# Patient Record
Sex: FEMALE | Race: WHITE | Employment: PART TIME | URBAN - METROPOLITAN AREA
[De-identification: names, ages, dates, MRNs, and addresses within clinical notes are randomized per-mention and may not be internally consistent; named-entity substitution may affect disease eponyms.]

---

## 2017-03-23 ENCOUNTER — TRANSFERRED RECORDS (OUTPATIENT)
Dept: HEALTH INFORMATION MANAGEMENT | Facility: CLINIC | Age: 77
End: 2017-03-23

## 2017-04-25 ENCOUNTER — RADIANT APPOINTMENT (OUTPATIENT)
Dept: MAMMOGRAPHY | Facility: CLINIC | Age: 77
End: 2017-04-25
Attending: INTERNAL MEDICINE
Payer: COMMERCIAL

## 2017-04-25 DIAGNOSIS — Z12.31 VISIT FOR SCREENING MAMMOGRAM: ICD-10-CM

## 2017-04-25 PROCEDURE — G0202 SCR MAMMO BI INCL CAD: HCPCS | Mod: TC

## 2017-07-10 DIAGNOSIS — E78.5 HYPERLIPIDEMIA LDL GOAL <130: Chronic | ICD-10-CM

## 2017-07-10 NOTE — TELEPHONE ENCOUNTER
pravastatin (PRAVACHOL) 20 MG      Last Written Prescription Date: 7/13/16  Last Fill Quantity: 90, # refills: 3  Last Office Visit with G, P or [facility] prescribing provider: 7/13/16       Lab Results   Component Value Date    CHOL 215 07/13/2016     Lab Results   Component Value Date    HDL 75 07/13/2016     Lab Results   Component Value Date     07/13/2016     Lab Results   Component Value Date    TRIG 70 07/13/2016     Lab Results   Component Value Date    CHOLHDLRATIO 2.8 06/17/2015

## 2017-07-11 RX ORDER — PRAVASTATIN SODIUM 20 MG
TABLET ORAL
Qty: 90 TABLET | Refills: 0 | Status: SHIPPED | OUTPATIENT
Start: 2017-07-11 | End: 2017-10-06

## 2017-07-11 NOTE — TELEPHONE ENCOUNTER
Medication is being filled for 1 time refill only due to:  Patient needs to be seen because it has been more than one year since last visit.  Rebekah Francisco RN- Triage [employer]

## 2017-07-21 DIAGNOSIS — E03.9 HYPOTHYROIDISM, UNSPECIFIED TYPE: ICD-10-CM

## 2017-07-21 RX ORDER — LEVOTHYROXINE SODIUM 75 UG/1
TABLET ORAL
Qty: 30 TABLET | Refills: 0 | Status: SHIPPED | OUTPATIENT
Start: 2017-07-21 | End: 2017-08-23

## 2017-07-21 NOTE — TELEPHONE ENCOUNTER
levothyroxine (SYNTHROID, LEVOTHROID) 75 MCG tablet  Last Written Prescription Date: 07/28/2016  Last Quantity: 90, # refills: 3  Last Office Visit with G, P or [facility] prescribing provider: 07/13/2016        TSH   Date Value Ref Range Status   07/13/2016 0.56 0.40 - 5.00 mU/L Final     Medication is being filled for 1 time refill only due to:  Patient needs to be seen because it has been more than one year since last visit.

## 2017-08-23 ENCOUNTER — OFFICE VISIT (OUTPATIENT)
Dept: FAMILY MEDICINE | Facility: CLINIC | Age: 77
End: 2017-08-23
Payer: COMMERCIAL

## 2017-08-23 VITALS
WEIGHT: 151 LBS | BODY MASS INDEX: 31.7 KG/M2 | HEIGHT: 58 IN | SYSTOLIC BLOOD PRESSURE: 114 MMHG | RESPIRATION RATE: 16 BRPM | DIASTOLIC BLOOD PRESSURE: 74 MMHG | OXYGEN SATURATION: 97 % | TEMPERATURE: 98.4 F | HEART RATE: 77 BPM

## 2017-08-23 DIAGNOSIS — Z78.0 ASYMPTOMATIC POSTMENOPAUSAL STATUS: ICD-10-CM

## 2017-08-23 DIAGNOSIS — E78.5 HYPERLIPIDEMIA LDL GOAL <130: Chronic | ICD-10-CM

## 2017-08-23 DIAGNOSIS — Z00.00 ROUTINE GENERAL MEDICAL EXAMINATION AT A HEALTH CARE FACILITY: Primary | ICD-10-CM

## 2017-08-23 DIAGNOSIS — K21.9 GASTROESOPHAGEAL REFLUX DISEASE WITHOUT ESOPHAGITIS: ICD-10-CM

## 2017-08-23 DIAGNOSIS — E03.9 HYPOTHYROIDISM, UNSPECIFIED TYPE: ICD-10-CM

## 2017-08-23 PROCEDURE — 99213 OFFICE O/P EST LOW 20 MIN: CPT | Mod: 25 | Performed by: INTERNAL MEDICINE

## 2017-08-23 PROCEDURE — 99397 PER PM REEVAL EST PAT 65+ YR: CPT | Performed by: INTERNAL MEDICINE

## 2017-08-23 RX ORDER — LEVOTHYROXINE SODIUM 75 UG/1
75 TABLET ORAL DAILY
Qty: 90 TABLET | Refills: 3 | Status: SHIPPED | OUTPATIENT
Start: 2017-08-23 | End: 2018-08-15

## 2017-08-23 RX ORDER — PANTOPRAZOLE SODIUM 20 MG/1
40 TABLET, DELAYED RELEASE ORAL
Qty: 30 TABLET | COMMUNITY
Start: 2017-08-23 | End: 2018-11-20

## 2017-08-23 NOTE — MR AVS SNAPSHOT
After Visit Summary   8/23/2017    Caren Eid    MRN: [id number]           Patient Information     Date Of Birth          1940        Visit Information        Provider Department      8/23/2017 2:00 PM Rajinder Baptiste MD [facility]        Today's Diagnoses     Routine general medical examination at a health care facility    -  1    Hypothyroidism, unspecified type        Hyperlipidemia LDL goal <130        Asymptomatic postmenopausal status        Gastroesophageal reflux disease without esophagitis          Care Instructions    I will let you know your lab results.   You are planning a bone density and a colonoscopy            Follow-ups after your visit        Future tests that were ordered for you today     Open Future Orders        Priority Expected Expires Ordered    Comprehensive metabolic panel (BMP + Alb, Alk Phos, ALT, AST, Total. Bili, TP) Routine 8/28/2017 12/23/2017 8/23/2017    Lipid Profile (Chol, Trig, HDL, LDL calc) Routine 8/28/2017 12/23/2017 8/23/2017    Vitamin B12 Routine 8/28/2017 12/23/2017 8/23/2017    CBC with platelets and differential Routine 8/28/2017 12/23/2017 8/23/2017    TSH with free T4 reflex Routine 8/28/2017 12/23/2017 8/23/2017    DX Hip/Pelvis/Spine Routine  8/23/2018 8/23/2017            Who to contact     If you have questions or need follow up information about today's clinic visit or your schedule please contact [facility] directly at [phone].  Normal or non-critical lab and imaging results will be communicated to you by MyChart, letter or phone within 4 business days after the clinic has received the results. If you do not hear from us within 7 days, please contact the clinic through MyChart or phone. If you have a critical or abnormal lab result, we will notify you by phone as soon as possible.  Submit refill requests through [employer] or call your pharmacy and they will forward the  "refill request to us. Please allow 3 business days for your refill to be completed.          Additional Information About Your Visit        [employer] Information     [employer] gives you secure access to your electronic health record. If you see a primary care provider, you can also send messages to your care team and make appointments. If you have questions, please call your primary care clinic.  If you do not have a primary care provider, please call [phone] and they will assist you.        Care EveryWhere ID     This is your Care EveryWhere ID. This could be used by other organizations to access your [facility] medical records  [id number]        Your Vitals Were     Pulse Temperature Respirations Height Pulse Oximetry BMI (Body Mass Index)    77 98.4  F (36.9  C) (Tympanic) 16 4' 10\" (1.473 m) 97% 31.56 kg/m2       Blood Pressure from Last 3 Encounters:   08/23/17 114/74   07/13/16 120/62   02/09/16 120/70    Weight from Last 3 Encounters:   08/23/17 151 lb (68.5 kg)   07/13/16 144 lb (65.3 kg)   02/09/16 152 lb (68.9 kg)                 Today's Medication Changes          These changes are accurate as of: 8/23/17  2:31 PM.  If you have any questions, ask your nurse or doctor.               These medicines have changed or have updated prescriptions.        Dose/Directions    levothyroxine 75 MCG tablet   Commonly known as:  SYNTHROID/LEVOTHROID   This may have changed:  See the new instructions.   Used for:  Hypothyroidism, unspecified type   Changed by:  Rajinder Baptiste MD        Dose:  75 mcg   Take 1 tablet (75 mcg) by mouth daily   Quantity:  90 tablet   Refills:  3            Where to get your medicines      These medications were sent to [facility] [address] [facility] [place] [place] - [address]  [address] [place] [address]     Phone:  [phone]     levothyroxine 75 MCG tablet                Primary Care Provider Office Phone # Fax #    Rajinder Baptiste [phone]       [address]  [facility] [address]        Equal Access to Services     ROMINA SINGH : Hadii aad ku hadrodríguezdinah Lewis, wawalterda perla, qadarwinta kavitamamanisha hutchison, papo escalante. So [facility] [phone].    ATENCIÓN: Si habla español, tiene a maurice disposición servicios gratuitos de asistencia lingüística. Llame al [phone].    We comply with applicable federal civil rights laws and [place] laws. We do not discriminate on the basis of race, color, national origin, age, disability sex, sexual orientation or gender identity.            Thank you!     Thank you for choosing [facility]  for your care. Our goal is always to provide you with excellent care. Hearing back from our patients is one way we can continue to improve our services. Please take a few minutes to complete the written survey that you may receive in the mail after your visit with us. Thank you!             Your Updated Medication List - Protect others around you: Learn how to safely use, store and throw away your medicines at www.disposemymeds.org.          This list is accurate as of: 8/23/17  2:31 PM.  Always use your most recent med list.                   Brand Name Dispense Instructions for use Diagnosis    aspirin 81 MG EC tablet     90 tablet    Take 1 tablet (81 mg) by mouth daily        CALCIUM 600 + MINERALS PO      Take  by mouth.        GLUCOSAMINE CHONDR COMPLEX PO      Take  by mouth.        levothyroxine 75 MCG tablet    SYNTHROID/LEVOTHROID    90 tablet    Take 1 tablet (75 mcg) by mouth daily    Hypothyroidism, unspecified type       OMEGA 3 PO      Take  by mouth.        pantoprazole 20 MG EC tablet    PROTONIX    30 tablet    Take by mouth 30-60 minutes before a meal.        pravastatin 20 MG tablet    PRAVACHOL    90 tablet    TAKE 1 TABLET BY MOUTH DAILY    Hyperlipidemia LDL goal <130       vitamin D 2000 UNITS Caps      Take 1 capful by mouth daily

## 2017-08-23 NOTE — NURSING NOTE
"Chief Complaint   Patient presents with     Physical       Initial /74 (BP Location: Left arm, Patient Position: Chair, Cuff Size: Adult Regular)  Pulse 77  Temp 98.4  F (36.9  C) (Tympanic)  Resp 16  Ht 4' 10\" (1.473 m)  Wt 141 lb (64 kg)  SpO2 97%  BMI 29.47 kg/m2 Estimated body mass index is 29.47 kg/(m^2) as calculated from the following:    Height as of this encounter: 4' 10\" (1.473 m).    Weight as of this encounter: 141 lb (64 kg).  Medication Reconciliation: complete    "
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
## 2017-09-08 ENCOUNTER — RADIANT APPOINTMENT (OUTPATIENT)
Dept: BONE DENSITY | Facility: CLINIC | Age: 77
End: 2017-09-08
Attending: INTERNAL MEDICINE
Payer: COMMERCIAL

## 2017-09-08 DIAGNOSIS — E03.9 HYPOTHYROIDISM, UNSPECIFIED TYPE: ICD-10-CM

## 2017-09-08 DIAGNOSIS — Z78.0 ASYMPTOMATIC POSTMENOPAUSAL STATUS: ICD-10-CM

## 2017-09-08 DIAGNOSIS — E78.5 HYPERLIPIDEMIA LDL GOAL <130: Chronic | ICD-10-CM

## 2017-09-08 DIAGNOSIS — K21.9 GASTROESOPHAGEAL REFLUX DISEASE WITHOUT ESOPHAGITIS: ICD-10-CM

## 2017-09-08 LAB
ALBUMIN SERPL-MCNC: 3.5 G/DL (ref 3.4–5)
ALP SERPL-CCNC: 120 U/L (ref 40–150)
ALT SERPL W P-5'-P-CCNC: 37 U/L (ref 0–50)
ANION GAP SERPL CALCULATED.3IONS-SCNC: 6 MMOL/L (ref 3–14)
AST SERPL W P-5'-P-CCNC: 21 U/L (ref 0–45)
BASOPHILS # BLD AUTO: 0 10E9/L (ref 0–0.2)
BASOPHILS NFR BLD AUTO: 0.3 %
BILIRUB SERPL-MCNC: 0.4 MG/DL (ref 0.2–1.3)
BUN SERPL-MCNC: 30 MG/DL (ref 7–30)
CALCIUM SERPL-MCNC: 9.4 MG/DL (ref 8.5–10.1)
CHLORIDE SERPL-SCNC: 110 MMOL/L (ref 94–109)
CHOLEST SERPL-MCNC: 221 MG/DL
CO2 SERPL-SCNC: 27 MMOL/L (ref 20–32)
CREAT SERPL-MCNC: 0.93 MG/DL (ref 0.52–1.04)
DIFFERENTIAL METHOD BLD: NORMAL
EOSINOPHIL # BLD AUTO: 0.1 10E9/L (ref 0–0.7)
EOSINOPHIL NFR BLD AUTO: 1.2 %
ERYTHROCYTE [DISTWIDTH] IN BLOOD BY AUTOMATED COUNT: 13.3 % (ref 10–15)
GFR SERPL CREATININE-BSD FRML MDRD: 59 ML/MIN/1.7M2
GLUCOSE SERPL-MCNC: 93 MG/DL (ref 70–99)
HCT VFR BLD AUTO: 43.1 % (ref 35–47)
HDLC SERPL-MCNC: 79 MG/DL
HGB BLD-MCNC: 14.3 G/DL (ref 11.7–15.7)
LDLC SERPL CALC-MCNC: 124 MG/DL
LYMPHOCYTES # BLD AUTO: 1.7 10E9/L (ref 0.8–5.3)
LYMPHOCYTES NFR BLD AUTO: 26.2 %
MCH RBC QN AUTO: 29.4 PG (ref 26.5–33)
MCHC RBC AUTO-ENTMCNC: 33.2 G/DL (ref 31.5–36.5)
MCV RBC AUTO: 89 FL (ref 78–100)
MONOCYTES # BLD AUTO: 0.5 10E9/L (ref 0–1.3)
MONOCYTES NFR BLD AUTO: 7.5 %
NEUTROPHILS # BLD AUTO: 4.3 10E9/L (ref 1.6–8.3)
NEUTROPHILS NFR BLD AUTO: 64.8 %
NONHDLC SERPL-MCNC: 142 MG/DL
PLATELET # BLD AUTO: 326 10E9/L (ref 150–450)
POTASSIUM SERPL-SCNC: 4 MMOL/L (ref 3.4–5.3)
PROT SERPL-MCNC: 7.3 G/DL (ref 6.8–8.8)
RBC # BLD AUTO: 4.86 10E12/L (ref 3.8–5.2)
SODIUM SERPL-SCNC: 143 MMOL/L (ref 133–144)
TRIGL SERPL-MCNC: 91 MG/DL
TSH SERPL DL<=0.005 MIU/L-ACNC: 0.6 MU/L (ref 0.4–4)
VIT B12 SERPL-MCNC: 405 PG/ML (ref 193–986)
WBC # BLD AUTO: 6.6 10E9/L (ref 4–11)

## 2017-09-08 PROCEDURE — 80050 GENERAL HEALTH PANEL: CPT | Performed by: INTERNAL MEDICINE

## 2017-09-08 PROCEDURE — 77080 DXA BONE DENSITY AXIAL: CPT | Performed by: FAMILY MEDICINE

## 2017-09-08 PROCEDURE — 36415 COLL VENOUS BLD VENIPUNCTURE: CPT | Performed by: INTERNAL MEDICINE

## 2017-09-08 PROCEDURE — 80061 LIPID PANEL: CPT | Performed by: INTERNAL MEDICINE

## 2017-09-08 PROCEDURE — 82607 VITAMIN B-12: CPT | Performed by: INTERNAL MEDICINE

## 2017-09-19 ENCOUNTER — OFFICE VISIT (OUTPATIENT)
Dept: FAMILY MEDICINE | Facility: CLINIC | Age: 77
End: 2017-09-19
Payer: COMMERCIAL

## 2017-09-19 VITALS
BODY MASS INDEX: 32.32 KG/M2 | SYSTOLIC BLOOD PRESSURE: 120 MMHG | DIASTOLIC BLOOD PRESSURE: 60 MMHG | HEIGHT: 58 IN | OXYGEN SATURATION: 99 % | HEART RATE: 88 BPM | WEIGHT: 154 LBS | RESPIRATION RATE: 20 BRPM | TEMPERATURE: 97.9 F

## 2017-09-19 DIAGNOSIS — M85.89 OSTEOPENIA OF MULTIPLE SITES: Primary | ICD-10-CM

## 2017-09-19 PROCEDURE — 99213 OFFICE O/P EST LOW 20 MIN: CPT | Performed by: INTERNAL MEDICINE

## 2017-09-19 RX ORDER — ALENDRONATE SODIUM 70 MG/1
70 TABLET ORAL
Qty: 12 TABLET | Refills: 3 | Status: SHIPPED | OUTPATIENT
Start: 2017-09-19 | End: 2018-08-09

## 2017-09-19 NOTE — PROGRESS NOTES
"  SUBJECTIVE:   Caren Eid is a 76 year old female who presents to clinic today for the following health issues:      Discuss recent Dexa Scan (9/8/2017) results      This patient is here for further discussion of treatment options regarding her low bone density.                          In 9/17, T+1 spine, -1.9 L femoral neck, -1.2 R; consider pharm Rx     Here is the calculation made by the radiologist: \" This patient's risks with the use of FRAX (based on available information) are 19 % for major osteoporotic fracture and 9.1 % for hip fracture.   Based on these guidelines, treatment (in addition to calcium and vitamin D) is recommended for this patient, after ruling out other causes of osteoporosis/low bone density.\"                                                                                                     We reviewed the treatment options. She has no need for dental work upcoming, and has no swallowing problems.          Her calcium and vitamin D intake could be increased a bit and we discussed that.   Problem list and histories reviewed & adjusted, as indicated.      Current Outpatient Prescriptions   Medication Sig Dispense Refill     pantoprazole (PROTONIX) 20 MG EC tablet Take by mouth 30-60 minutes before a meal. 30 tablet      levothyroxine (SYNTHROID/LEVOTHROID) 75 MCG tablet Take 1 tablet (75 mcg) by mouth daily 90 tablet 3     pravastatin (PRAVACHOL) 20 MG tablet TAKE 1 TABLET BY MOUTH DAILY 90 tablet 0     aspirin 81 MG EC tablet Take 1 tablet (81 mg) by mouth daily 90 tablet 3     Cholecalciferol (VITAMIN D) 2000 UNITS CAPS Take 1 capful by mouth daily       Calcium Carbonate-Vit D-Min (CALCIUM 600 + MINERALS PO) Take  by mouth.       Glucosamine-Chondroitin (GLUCOSAMINE CHONDR COMPLEX PO) Take  by mouth.       Omega-3 Fatty Acids (OMEGA 3 PO) Take  by mouth.         Reviewed and updated as needed this visit by clinical staff       Reviewed and updated as needed this visit by " "Provider         ROS:  CONSTITUTIONAL:NEGATIVE for fever, chills, change in weight  GI: NEGATIVE for dysphagia and Hx GERD    OBJECTIVE:                                                    /60 (BP Location: Left arm, Patient Position: Chair, Cuff Size: Adult Regular)  Pulse 88  Temp 97.9  F (36.6  C)  Resp 20  Ht 4' 10\" (1.473 m)  Wt 154 lb (69.9 kg)  SpO2 99%  Breastfeeding? No  BMI 32.19 kg/m2  Body mass index is 32.19 kg/(m^2).  GENERAL APPEARANCE: alert and no distress    Diagnostic test results:  See above       ASSESSMENT/PLAN:                                                        ICD-10-CM    1. Osteopenia of multiple sites M85.89 alendronate (FOSAMAX) 70 MG tablet       We reviewed the treatment options at length.      She agrees to start alendronate 70 mg weekly. We discussed at length how to properly take this.  Patient Instructions   Aim for a total of 1,500 mg of calcium per day, and 3,000 IU of vitamin D per day.                   Take the alendronate weekly as we discussed.                     We will check a bone density in 2 years.         Rajinder Baptiste MD  [facility]  "

## 2017-09-19 NOTE — PATIENT INSTRUCTIONS
Aim for a total of 1,500 mg of calcium per day, and 3,000 IU of vitamin D per day.                   Take the alendronate weekly as we discussed.                     We will check a bone density in 2 years.

## 2017-09-19 NOTE — NURSING NOTE
"Chief Complaint   Patient presents with     Results       Initial /60 (BP Location: Left arm, Patient Position: Chair, Cuff Size: Adult Regular)  Pulse 88  Temp 97.9  F (36.6  C)  Resp 20  Ht 4' 10\" (1.473 m)  Wt 154 lb (69.9 kg)  SpO2 99%  Breastfeeding? No  BMI 32.19 kg/m2 Estimated body mass index is 32.19 kg/(m^2) as calculated from the following:    Height as of this encounter: 4' 10\" (1.473 m).    Weight as of this encounter: 154 lb (69.9 kg).  Medication Reconciliation: complete   Elen Rodriguez LPN  "

## 2017-09-19 NOTE — MR AVS SNAPSHOT
After Visit Summary   9/19/2017    Caren Eid    MRN: [id number]           Patient Information     Date Of Birth          1940        Visit Information        Provider Department      9/19/2017 3:00 PM Rajinder Baptiste MD [facility]        Today's Diagnoses     Osteopenia of multiple sites    -  1      Care Instructions    Aim for a total of 1,500 mg of calcium per day, and 3,000 IU of vitamin D per day.                   Take the alendronate weekly as we discussed.                     We will check a bone density in 2 years.             Follow-ups after your visit        Who to contact     If you have questions or need follow up information about today's clinic visit or your schedule please contact [facility] directly at [phone].  Normal or non-critical lab and imaging results will be communicated to you by MyChart, letter or phone within 4 business days after the clinic has received the results. If you do not hear from us within 7 days, please contact the clinic through MyChart or phone. If you have a critical or abnormal lab result, we will notify you by phone as soon as possible.  Submit refill requests through [employer] or call your pharmacy and they will forward the refill request to us. Please allow 3 business days for your refill to be completed.          Additional Information About Your Visit        MyChart Information     [employer] gives you secure access to your electronic health record. If you see a primary care provider, you can also send messages to your care team and make appointments. If you have questions, please call your primary care clinic.  If you do not have a primary care provider, please call [phone] and they will assist you.        Care EveryWhere ID     This is your Care EveryWhere ID. This could be used by other organizations to access your [place] medical records  [id number]        Your Vitals  "Were     Pulse Temperature Respirations Height Pulse Oximetry Breastfeeding?    88 97.9  F (36.6  C) 20 4' 10\" (1.473 m) 99% No    BMI (Body Mass Index)                   32.19 kg/m2            Blood Pressure from Last 3 Encounters:   09/19/17 120/60   08/23/17 114/74   07/13/16 120/62    Weight from Last 3 Encounters:   09/19/17 154 lb (69.9 kg)   08/23/17 151 lb (68.5 kg)   07/13/16 144 lb (65.3 kg)              Today, you had the following     No orders found for display         Today's Medication Changes          These changes are accurate as of: 9/19/17  3:24 PM.  If you have any questions, ask your nurse or doctor.               Start taking these medicines.        Dose/Directions    alendronate 70 MG tablet   Commonly known as:  FOSAMAX   Used for:  Osteopenia of multiple sites   Started by:  Rajinder Baptiste MD        Dose:  70 mg   Take 1 tablet (70 mg) by mouth every 7 days Take 60 minutes before am meal with 8 oz. water. Remain upright for 30 minutes.   Quantity:  12 tablet   Refills:  3            Where to get your medicines      These medications were sent to [place] [address] [place] [facility] [place] - [address]  [address] [facility] [address]     Phone:  [phone]     alendronate 70 MG tablet                Primary Care Provider Office Phone # Fax #    Rajinder Baptiste [phone] [address] [facility] [address]        Equal Access to Services     [facility] AH: Hadii mirlande ku hadasho Soomaali, waaxda luqadaha, qaybta kaalmada adeegyada, waxay shay duran . So [facility] [phone].    ATENCIÓN: Si habla español, tiene a maurice disposición servicios gratuitos de asistencia lingüística. Llame al [phone].    We comply with applicable federal civil rights laws and [place] laws. We do not discriminate on the basis of race, color, national origin, age, disability sex, sexual orientation or gender identity.            Thank you!     Thank you for " choosing [facility]  for your care. Our goal is always to provide you with excellent care. Hearing back from our patients is one way we can continue to improve our services. Please take a few minutes to complete the written survey that you may receive in the mail after your visit with us. Thank you!             Your Updated Medication List - Protect others around you: Learn how to safely use, store and throw away your medicines at www.disposemymeds.org.          This list is accurate as of: 9/19/17  3:24 PM.  Always use your most recent med list.                   Brand Name Dispense Instructions for use Diagnosis    alendronate 70 MG tablet    FOSAMAX    12 tablet    Take 1 tablet (70 mg) by mouth every 7 days Take 60 minutes before am meal with 8 oz. water. Remain upright for 30 minutes.    Osteopenia of multiple sites       aspirin 81 MG EC tablet     90 tablet    Take 1 tablet (81 mg) by mouth daily        CALCIUM 600 + MINERALS PO      Take  by mouth.        GLUCOSAMINE CHONDR COMPLEX PO      Take  by mouth.        levothyroxine 75 MCG tablet    SYNTHROID/LEVOTHROID    90 tablet    Take 1 tablet (75 mcg) by mouth daily    Hypothyroidism, unspecified type       OMEGA 3 PO      Take  by mouth.        pantoprazole 20 MG EC tablet    PROTONIX    30 tablet    Take by mouth 30-60 minutes before a meal.        pravastatin 20 MG tablet    PRAVACHOL    90 tablet    TAKE 1 TABLET BY MOUTH DAILY    Hyperlipidemia LDL goal <130       vitamin D 2000 UNITS Caps      Take 1 capful by mouth daily

## 2017-10-06 DIAGNOSIS — E78.5 HYPERLIPIDEMIA LDL GOAL <130: Chronic | ICD-10-CM

## 2017-10-06 RX ORDER — PRAVASTATIN SODIUM 20 MG
TABLET ORAL
Qty: 90 TABLET | Refills: 2 | Status: SHIPPED | OUTPATIENT
Start: 2017-10-06 | End: 2017-11-14

## 2017-10-06 NOTE — TELEPHONE ENCOUNTER
pravastatin (PRAVACHOL) 20 MG tablet  Last Written Prescription Date: 07/11/2017  Last Fill Quantity: 90, # refills: 0  Last Office Visit with [facility], [facility] or [facility] prescribing provider: 09/19/2017       Lab Results   Component Value Date    CHOL 221 09/08/2017     Lab Results   Component Value Date    HDL 79 09/08/2017     Lab Results   Component Value Date     09/08/2017     Lab Results   Component Value Date    TRIG 91 09/08/2017     Lab Results   Component Value Date    CHOLHDLRATIO 2.8 06/17/2015     Prescription approved per [facility] Refill Protocol.

## 2017-11-14 ENCOUNTER — OFFICE VISIT (OUTPATIENT)
Dept: FAMILY MEDICINE | Facility: CLINIC | Age: 77
End: 2017-11-14
Payer: COMMERCIAL

## 2017-11-14 VITALS
HEART RATE: 80 BPM | SYSTOLIC BLOOD PRESSURE: 122 MMHG | RESPIRATION RATE: 20 BRPM | TEMPERATURE: 97.8 F | OXYGEN SATURATION: 99 % | BODY MASS INDEX: 32.32 KG/M2 | DIASTOLIC BLOOD PRESSURE: 64 MMHG | WEIGHT: 154 LBS | HEIGHT: 58 IN

## 2017-11-14 DIAGNOSIS — Z12.11 SCREEN FOR COLON CANCER: ICD-10-CM

## 2017-11-14 DIAGNOSIS — H93.12 EAR NOISE/BUZZING, LEFT: Primary | ICD-10-CM

## 2017-11-14 PROCEDURE — 99212 OFFICE O/P EST SF 10 MIN: CPT | Performed by: INTERNAL MEDICINE

## 2017-11-14 RX ORDER — PRAVASTATIN SODIUM 20 MG
TABLET ORAL
Qty: 90 TABLET | Refills: 3 | COMMUNITY
Start: 2017-11-14 | End: 2018-11-20

## 2017-11-14 NOTE — MR AVS SNAPSHOT
After Visit Summary   11/14/2017    Caren Eid    MRN: [id number]           Patient Information     Date Of Birth          1940        Visit Information        Provider Department      11/14/2017 1:45 PM Rajinder Baptiste MD [facility]        Today's Diagnoses     Ear noise/buzzing, left    -  1    Screen for colon cancer          Care Instructions    You are planning a colonoscopy            Follow-ups after your visit        Additional Services     GASTROENTEROLOGY ADULT REF PROCEDURE ONLY       Last Lab Result: Creatinine (mg/dL)       Date                     Value                 09/08/2017               0.93             ----------  Body mass index is 32.19 kg/(m^2).     Needed:  No  Language:  English    Patient will be contacted to schedule procedure.     Please be aware that coverage of these services is subject to the terms and limitations of your health insurance plan.  Call member services at your health plan with any benefit or coverage questions.  Any procedures must be performed at a [place] facility OR coordinated by your clinic's referral office.    Please bring the following with you to your appointment:    (1) Any X-Rays, CTs or MRIs which have been performed.  Contact the facility where they were done to arrange for  prior to your scheduled appointment.    (2) List of current medications   (3) This referral request   (4) Any documents/labs given to you for this referral                  Who to contact     If you have questions or need follow up information about today's clinic visit or your schedule please contact [facility] directly at [phone].  Normal or non-critical lab and imaging results will be communicated to you by MyChart, letter or phone within 4 business days after the clinic has received the results. If you do not hear from us within 7 days, please contact the clinic through  "MyChart or phone. If you have a critical or abnormal lab result, we will notify you by phone as soon as possible.  Submit refill requests through [employer] or call your pharmacy and they will forward the refill request to us. Please allow 3 business days for your refill to be completed.          Additional Information About Your Visit        Always [employer] Information     [employer] gives you secure access to your electronic health record. If you see a primary care provider, you can also send messages to your care team and make appointments. If you have questions, please call your primary care clinic.  If you do not have a primary care provider, please call [phone] and they will assist you.        Care EveryWhere ID     This is your Care EveryWhere ID. This could be used by other organizations to access your [place] medical records  [id number]        Your Vitals Were     Pulse Temperature Respirations Height Pulse Oximetry Breastfeeding?    80 97.8  F (36.6  C) 20 4' 10\" (1.473 m) 99% No    BMI (Body Mass Index)                   32.19 kg/m2            Blood Pressure from Last 3 Encounters:   11/14/17 122/64   09/19/17 120/60   08/23/17 114/74    Weight from Last 3 Encounters:   11/14/17 154 lb (69.9 kg)   09/19/17 154 lb (69.9 kg)   08/23/17 151 lb (68.5 kg)              We Performed the Following     GASTROENTEROLOGY ADULT REF PROCEDURE ONLY          Today's Medication Changes          These changes are accurate as of: 11/14/17  2:07 PM.  If you have any questions, ask your nurse or doctor.               These medicines have changed or have updated prescriptions.        Dose/Directions    pravastatin 20 MG tablet   Commonly known as:  PRAVACHOL   This may have changed:  See the new instructions.   Changed by:  Rajinder Baptiste MD        TAKE 1 TABLET BY MOUTH DAILY.   Quantity:  90 tablet   Refills:  3                Primary Care Provider Office Phone # Fax #    Rajinder Baptiste [phone] [address]  [facility] [address]        Equal Access to Services     ROMINA SINGH : Hadii mirlande ku hadrodríguezo Sovanessaali, waaxda luqadaha, qaybta kaalmada kimmielisamanisha, papo enriquezsabinoalice escalante. So [facility] [phone].    ATENCIÓN: Si habla español, tiene a maurice disposición servicios gratuitos de asistencia lingüística. Llame al [phone].    We comply with applicable federal civil rights laws and [place] laws. We do not discriminate on the basis of race, color, national origin, age, disability, sex, sexual orientation, or gender identity.            Thank you!     Thank you for choosing [facility]  for your care. Our goal is always to provide you with excellent care. Hearing back from our patients is one way we can continue to improve our services. Please take a few minutes to complete the written survey that you may receive in the mail after your visit with us. Thank you!             Your Updated Medication List - Protect others around you: Learn how to safely use, store and throw away your medicines at www.disposemymeds.org.          This list is accurate as of: 11/14/17  2:07 PM.  Always use your most recent med list.                   Brand Name Dispense Instructions for use Diagnosis    alendronate 70 MG tablet    FOSAMAX    12 tablet    Take 1 tablet (70 mg) by mouth every 7 days Take 60 minutes before am meal with 8 oz. water. Remain upright for 30 minutes.    Osteopenia of multiple sites       aspirin 81 MG EC tablet     90 tablet    Take 1 tablet (81 mg) by mouth daily        CALCIUM 600 + MINERALS PO      Take  by mouth.        GLUCOSAMINE CHONDR COMPLEX PO      Take  by mouth.        levothyroxine 75 MCG tablet    SYNTHROID/LEVOTHROID    90 tablet    Take 1 tablet (75 mcg) by mouth daily    Hypothyroidism, unspecified type       OMEGA 3 PO      Take  by mouth.        pantoprazole 20 MG EC tablet    PROTONIX    30 tablet    Take by mouth 30-60 minutes before a  meal.        pravastatin 20 MG tablet    PRAVACHOL    90 tablet    TAKE 1 TABLET BY MOUTH DAILY.        vitamin D 2000 UNITS Caps      Take 1 capful by mouth daily

## 2017-11-14 NOTE — PROGRESS NOTES
"  SUBJECTIVE:   Caren Eid is a 77 year old female who presents to clinic today for the following health issues:      Check Left ear-sensation and \"noise\"? In there for about 2 weeks?              She has noticed a crackling sound in her left ear canal when she palpates her external ear.                      She also wants help scheduling a colonoscopy.    Problem list and histories reviewed & adjusted, as indicated.      Current Outpatient Prescriptions   Medication Sig Dispense Refill     pravastatin (PRAVACHOL) 20 MG tablet TAKE 1 TABLET BY MOUTH DAILY. APPT NEEDED FOR FURTHER REFILLS 90 tablet 2     alendronate (FOSAMAX) 70 MG tablet Take 1 tablet (70 mg) by mouth every 7 days Take 60 minutes before am meal with 8 oz. water. Remain upright for 30 minutes. 12 tablet 3     pantoprazole (PROTONIX) 20 MG EC tablet Take by mouth 30-60 minutes before a meal. 30 tablet      levothyroxine (SYNTHROID/LEVOTHROID) 75 MCG tablet Take 1 tablet (75 mcg) by mouth daily 90 tablet 3     aspirin 81 MG EC tablet Take 1 tablet (81 mg) by mouth daily 90 tablet 3     Cholecalciferol (VITAMIN D) 2000 UNITS CAPS Take 1 capful by mouth daily       Calcium Carbonate-Vit D-Min (CALCIUM 600 + MINERALS PO) Take  by mouth.       Glucosamine-Chondroitin (GLUCOSAMINE CHONDR COMPLEX PO) Take  by mouth.       Omega-3 Fatty Acids (OMEGA 3 PO) Take  by mouth.       No Known Allergies  BP Readings from Last 3 Encounters:   11/14/17 122/64   09/19/17 120/60   08/23/17 114/74    Wt Readings from Last 3 Encounters:   11/14/17 154 lb (69.9 kg)   09/19/17 154 lb (69.9 kg)   08/23/17 151 lb (68.5 kg)                      Reviewed and updated as needed this visit by clinical staff       Reviewed and updated as needed this visit by Provider         ROS:  ENT/MOUTH: NEGATIVE for ear pain both and earache both    OBJECTIVE:                                                    /64 (BP Location: Left arm, Patient Position: Chair, Cuff Size: Adult " "Large)  Pulse 80  Temp 97.8  F (36.6  C)  Resp 20  Ht 4' 10\" (1.473 m)  Wt 154 lb (69.9 kg)  SpO2 99%  Breastfeeding? No  BMI 32.19 kg/m2  Body mass index is 32.19 kg/(m^2).  HENT: There is some dried wax in the distal left ear canal. When I palpate that with the otoscope, she can hear the crackling sound that she had described    Diagnostic test results:  none        ASSESSMENT/PLAN:                                                      ICD-10-CM    1. Ear noise/buzzing, left H93.12    2. Screen for colon cancer Z12.11 GASTROENTEROLOGY ADULT REF PROCEDURE ONLY       We will plan a gentle left ear wash.                           After the ear lavage, the crackling sensation resolved.   Patient Instructions   You are planning a colonoscopy.                        If you have the crackling/buzz sensation again, use some Debrox wax softening drops.         Rajinder Baptiste MD  [facility]  "

## 2017-11-14 NOTE — PATIENT INSTRUCTIONS
You are planning a colonoscopy.                        If you have the crackling/buzz sensation again, use some Debrox wax softening drops.

## 2017-11-14 NOTE — NURSING NOTE
"Chief Complaint   Patient presents with     Ear Problem       Initial /64 (BP Location: Left arm, Patient Position: Chair, Cuff Size: Adult Large)  Pulse 80  Temp 97.8  F (36.6  C)  Resp 20  Ht 4' 10\" (1.473 m)  Wt 154 lb (69.9 kg)  SpO2 99%  Breastfeeding? No  BMI 32.19 kg/m2 Estimated body mass index is 32.19 kg/(m^2) as calculated from the following:    Height as of this encounter: 4' 10\" (1.473 m).    Weight as of this encounter: 154 lb (69.9 kg).  Medication Reconciliation: complete   Elen Rodriguez LPN  "

## 2018-02-01 ENCOUNTER — TRANSFERRED RECORDS (OUTPATIENT)
Dept: HEALTH INFORMATION MANAGEMENT | Facility: CLINIC | Age: 78
End: 2018-02-01

## 2018-03-28 ENCOUNTER — TRANSFERRED RECORDS (OUTPATIENT)
Dept: HEALTH INFORMATION MANAGEMENT | Facility: CLINIC | Age: 78
End: 2018-03-28

## 2018-05-22 ENCOUNTER — RADIANT APPOINTMENT (OUTPATIENT)
Dept: MAMMOGRAPHY | Facility: CLINIC | Age: 78
End: 2018-05-22
Attending: INTERNAL MEDICINE
Payer: COMMERCIAL

## 2018-05-22 DIAGNOSIS — Z12.31 VISIT FOR SCREENING MAMMOGRAM: ICD-10-CM

## 2018-05-22 PROCEDURE — 77067 SCR MAMMO BI INCL CAD: CPT | Mod: TC

## 2018-08-15 DIAGNOSIS — E03.9 HYPOTHYROIDISM, UNSPECIFIED TYPE: ICD-10-CM

## 2018-08-15 NOTE — TELEPHONE ENCOUNTER
"Requested Prescriptions   Pending Prescriptions Disp Refills     levothyroxine (SYNTHROID/LEVOTHROID) 75 MCG tablet [Pharmacy Med Name: LEVOTHYROXINE 75 MCG TABLET]  Last Written Prescription Date:  8/23/17  Last Fill Quantity: 90,  # refills: 3   Last office visit: 11/14/2017 with prescribing provider:  Oliva   Future Office Visit:     90 tablet 3     Sig: TAKE 1 TABLET (75 MCG) BY MOUTH DAILY    Thyroid Protocol Passed    8/15/2018  1:33 AM       Passed - Patient is 12 years or older       Passed - Recent (12 mo) or future (30 days) visit within the authorizing provider's specialty    Patient had office visit in the last 12 months or has a visit in the next 30 days with authorizing provider or within the authorizing provider's specialty.  See \"Patient Info\" tab in inbasket, or \"Choose Columns\" in Meds & Orders section of the refill encounter.           Passed - Normal TSH on file in past 12 months    Recent Labs   Lab Test  09/08/17   0809   TSH  0.60             Passed - No active pregnancy on record    If patient is pregnant or has had a positive pregnancy test, please check TSH.         Passed - No positive pregnancy test in past 12 months    If patient is pregnant or has had a positive pregnancy test, please check TSH.            "

## 2018-08-16 RX ORDER — LEVOTHYROXINE SODIUM 75 UG/1
TABLET ORAL
Qty: 90 TABLET | Refills: 0 | Status: SHIPPED | OUTPATIENT
Start: 2018-08-16 | End: 2018-11-11

## 2018-08-16 NOTE — TELEPHONE ENCOUNTER
Medication is being filled for 1 time refill only due to:  Patient needs labs due in September for thyroid..

## 2018-11-20 ENCOUNTER — OFFICE VISIT (OUTPATIENT)
Dept: FAMILY MEDICINE | Facility: CLINIC | Age: 78
End: 2018-11-20
Payer: COMMERCIAL

## 2018-11-20 VITALS
HEART RATE: 82 BPM | DIASTOLIC BLOOD PRESSURE: 70 MMHG | SYSTOLIC BLOOD PRESSURE: 130 MMHG | WEIGHT: 157 LBS | TEMPERATURE: 97.5 F | HEIGHT: 58 IN | RESPIRATION RATE: 20 BRPM | OXYGEN SATURATION: 98 % | BODY MASS INDEX: 32.95 KG/M2

## 2018-11-20 DIAGNOSIS — M85.89 OSTEOPENIA OF MULTIPLE SITES: ICD-10-CM

## 2018-11-20 DIAGNOSIS — E78.5 HYPERLIPIDEMIA LDL GOAL <130: Chronic | ICD-10-CM

## 2018-11-20 DIAGNOSIS — I35.0 AORTIC VALVE STENOSIS, ETIOLOGY OF CARDIAC VALVE DISEASE UNSPECIFIED: ICD-10-CM

## 2018-11-20 DIAGNOSIS — Z80.3 FAMILY HISTORY OF MALIGNANT NEOPLASM OF BREAST: ICD-10-CM

## 2018-11-20 DIAGNOSIS — Z00.00 ROUTINE HISTORY AND PHYSICAL EXAMINATION OF ADULT: Primary | ICD-10-CM

## 2018-11-20 DIAGNOSIS — E03.9 HYPOTHYROIDISM, UNSPECIFIED TYPE: ICD-10-CM

## 2018-11-20 DIAGNOSIS — K21.9 GASTROESOPHAGEAL REFLUX DISEASE WITHOUT ESOPHAGITIS: ICD-10-CM

## 2018-11-20 DIAGNOSIS — R01.1 SYSTOLIC EJECTION MURMUR: ICD-10-CM

## 2018-11-20 PROCEDURE — G0439 PPPS, SUBSEQ VISIT: HCPCS | Performed by: INTERNAL MEDICINE

## 2018-11-20 RX ORDER — PANTOPRAZOLE SODIUM 20 MG/1
TABLET, DELAYED RELEASE ORAL
Qty: 90 TABLET | Refills: 3 | Status: SHIPPED | OUTPATIENT
Start: 2018-11-20 | End: 2019-12-10

## 2018-11-20 ASSESSMENT — ACTIVITIES OF DAILY LIVING (ADL): CURRENT_FUNCTION: NO ASSISTANCE NEEDED

## 2018-11-20 NOTE — PATIENT INSTRUCTIONS
You are planning to return for lab testing, and to have an echocardiogram.                                                                  Call [phone] to schedule the echocardiogram.

## 2018-11-20 NOTE — MR AVS SNAPSHOT
After Visit Summary   11/20/2018    Caren Eid    MRN: [id number]           Patient Information     Date Of Birth          1940        Visit Information        Provider Department      11/20/2018 3:45 PM Rajinder Baptiste MD [facility]        Today's Diagnoses     Routine history and physical examination of adult    -  1    Hypothyroidism, unspecified type        Hyperlipidemia LDL goal <130        Gastroesophageal reflux disease without esophagitis        Osteopenia of multiple sites        Family history of malignant neoplasm of breast        Systolic ejection murmur          Care Instructions    You are planning to return for lab testing, and to have an echocardiogram.                                                                  Call [phone] to schedule the echocardiogram.                   Follow-ups after your visit        Future tests that were ordered for you today     Open Future Orders        Priority Expected Expires Ordered    Echocardiogram Complete Routine  11/20/2019 11/20/2018    Lipid Profile (Chol, Trig, HDL, LDL calc) Routine 11/21/2018 2/20/2019 11/20/2018    Comprehensive metabolic panel (BMP + Alb, Alk Phos, ALT, AST, Total. Bili, TP) Routine 11/21/2018 2/20/2019 11/20/2018    TSH with free T4 reflex Routine 11/21/2018 2/20/2019 11/20/2018    CBC with platelets and differential Routine 11/21/2018 2/20/2019 11/20/2018            Who to contact     If you have questions or need follow up information about today's clinic visit or your schedule please contact [facility] directly at [phone].  Normal or non-critical lab and imaging results will be communicated to you by MyChart, letter or phone within 4 business days after the clinic has received the results. If you do not hear from us within 7 days, please contact the clinic through MyChart or phone. If you have a critical or abnormal lab result, we will  "notify you by phone as soon as possible.  Submit refill requests through [employer] or call your pharmacy and they will forward the refill request to us. Please allow 3 business days for your refill to be completed.          Additional Information About Your Visit        ChatStathart Information     [employer] gives you secure access to your electronic health record. If you see a primary care provider, you can also send messages to your care team and make appointments. If you have questions, please call your primary care clinic.  If you do not have a primary care provider, please call [phone] and they will assist you.        Care EveryWhere ID     This is your Care EveryWhere ID. This could be used by other organizations to access your [place] medical records  [id number]        Your Vitals Were     Pulse Temperature Respirations Height Pulse Oximetry Breastfeeding?    82 97.5  F (36.4  C) 20 4' 10\" (1.473 m) 98% No    BMI (Body Mass Index)                   32.81 kg/m2            Blood Pressure from Last 3 Encounters:   11/20/18 130/70   11/14/17 122/64   09/19/17 120/60    Weight from Last 3 Encounters:   11/20/18 157 lb (71.2 kg)   11/14/17 154 lb (69.9 kg)   09/19/17 154 lb (69.9 kg)               Primary Care Provider Office Phone # Fax #    Rajinder Baptiste [phone] [address] [facility] [address]        Equal Access to Services     [facility]: Hadii aad ku hadasho Soomaali, waaxda luqadaha, qaybta kaalmada adeegyada, papo alcaraz la'adebayo escalante. So [facility] [phone].    ATENCIÓN: Si habla español, tiene a maurice disposición servicios gratuitos de asistencia lingüística. Nathanael al [phone].    We comply with applicable federal civil rights laws and [place] laws. We do not discriminate on the basis of race, color, national origin, age, disability, sex, sexual orientation, or gender identity.            Thank you!     Thank you for choosing [facility] " [facility]  [facility] your [facility]. Our goal is always to provide you with excellent care. Hearing back from our patients is one way we can continue to improve our services. Please take a few minutes to complete the written survey that you may receive in the mail after your visit with us. Thank you!             Your Updated Medication List - Protect others around you: Learn how to safely use, store and throw away your medicines at www.disposemymeds.org.          This list is accurate as of 11/20/18  4:30 PM.  Always use your most recent med list.                   Brand Name Dispense Instructions for use Diagnosis    alendronate 70 MG tablet    FOSAMAX    12 tablet    TAKE 1 TABLET BY MOUTH EVERY 7 DAYS. TAKE 60 MIN BEFORE AM MEAL WITH 8OZ WATER. STAY UPRIGHT X30MIN.    Osteopenia of multiple sites       aspirin 81 MG EC tablet     90 tablet    Take 1 tablet (81 mg) by mouth daily        CALCIUM 600 + MINERALS PO      Take  by mouth.        GLUCOSAMINE CHONDR COMPLEX PO      Take  by mouth.        levothyroxine 75 MCG tablet    SYNTHROID/LEVOTHROID    90 tablet    TAKE 1 TABLET BY MOUTH EVERY DAY. APPOINTMENT & LABS NEEDED IN SEPTEMBER FOR REFILL.    Hypothyroidism, unspecified type       OMEGA 3 PO      Take  by mouth.        pantoprazole 20 MG EC tablet    PROTONIX    30 tablet    40 mg Take by mouth 30-60 minutes before a meal.        pravastatin 20 MG tablet    PRAVACHOL    90 tablet    Take 1 tablet (20 mg) by mouth daily    Hyperlipidemia LDL goal <130       vitamin D 2000 units Caps      Take 1 capful by mouth daily

## 2018-11-26 DIAGNOSIS — K21.9 GASTROESOPHAGEAL REFLUX DISEASE WITHOUT ESOPHAGITIS: ICD-10-CM

## 2018-11-26 DIAGNOSIS — E78.5 HYPERLIPIDEMIA LDL GOAL <130: Chronic | ICD-10-CM

## 2018-11-26 DIAGNOSIS — M85.89 OSTEOPENIA OF MULTIPLE SITES: ICD-10-CM

## 2018-11-26 DIAGNOSIS — E03.9 HYPOTHYROIDISM, UNSPECIFIED TYPE: ICD-10-CM

## 2018-11-26 LAB
ALBUMIN SERPL-MCNC: 3.7 G/DL (ref 3.4–5)
ALP SERPL-CCNC: 79 U/L (ref 40–150)
ALT SERPL W P-5'-P-CCNC: 36 U/L (ref 0–50)
ANION GAP SERPL CALCULATED.3IONS-SCNC: 9 MMOL/L (ref 3–14)
AST SERPL W P-5'-P-CCNC: 28 U/L (ref 0–45)
BASOPHILS # BLD AUTO: 0 10E9/L (ref 0–0.2)
BASOPHILS NFR BLD AUTO: 0.5 %
BILIRUB SERPL-MCNC: 0.5 MG/DL (ref 0.2–1.3)
BUN SERPL-MCNC: 24 MG/DL (ref 7–30)
CALCIUM SERPL-MCNC: 10 MG/DL (ref 8.5–10.1)
CHLORIDE SERPL-SCNC: 107 MMOL/L (ref 94–109)
CHOLEST SERPL-MCNC: 214 MG/DL
CO2 SERPL-SCNC: 24 MMOL/L (ref 20–32)
CREAT SERPL-MCNC: 1.01 MG/DL (ref 0.52–1.04)
DIFFERENTIAL METHOD BLD: NORMAL
EOSINOPHIL # BLD AUTO: 0.2 10E9/L (ref 0–0.7)
EOSINOPHIL NFR BLD AUTO: 3.6 %
ERYTHROCYTE [DISTWIDTH] IN BLOOD BY AUTOMATED COUNT: 13.2 % (ref 10–15)
GFR SERPL CREATININE-BSD FRML MDRD: 53 ML/MIN/1.7M2
GLUCOSE SERPL-MCNC: 97 MG/DL (ref 70–99)
HCT VFR BLD AUTO: 42.5 % (ref 35–47)
HDLC SERPL-MCNC: 70 MG/DL
HGB BLD-MCNC: 14 G/DL (ref 11.7–15.7)
LDLC SERPL CALC-MCNC: 116 MG/DL
LYMPHOCYTES # BLD AUTO: 1.8 10E9/L (ref 0.8–5.3)
LYMPHOCYTES NFR BLD AUTO: 27 %
MCH RBC QN AUTO: 29.5 PG (ref 26.5–33)
MCHC RBC AUTO-ENTMCNC: 32.9 G/DL (ref 31.5–36.5)
MCV RBC AUTO: 90 FL (ref 78–100)
MONOCYTES # BLD AUTO: 0.6 10E9/L (ref 0–1.3)
MONOCYTES NFR BLD AUTO: 8.5 %
NEUTROPHILS # BLD AUTO: 4 10E9/L (ref 1.6–8.3)
NEUTROPHILS NFR BLD AUTO: 60.4 %
NONHDLC SERPL-MCNC: 144 MG/DL
PLATELET # BLD AUTO: 346 10E9/L (ref 150–450)
POTASSIUM SERPL-SCNC: 4.3 MMOL/L (ref 3.4–5.3)
PROT SERPL-MCNC: 7.7 G/DL (ref 6.8–8.8)
RBC # BLD AUTO: 4.75 10E12/L (ref 3.8–5.2)
SODIUM SERPL-SCNC: 140 MMOL/L (ref 133–144)
TRIGL SERPL-MCNC: 142 MG/DL
TSH SERPL DL<=0.005 MIU/L-ACNC: 0.83 MU/L (ref 0.4–4)
WBC # BLD AUTO: 6.6 10E9/L (ref 4–11)

## 2018-11-26 PROCEDURE — 85025 COMPLETE CBC W/AUTO DIFF WBC: CPT | Performed by: INTERNAL MEDICINE

## 2018-11-26 PROCEDURE — 80053 COMPREHEN METABOLIC PANEL: CPT | Performed by: INTERNAL MEDICINE

## 2018-11-26 PROCEDURE — 80061 LIPID PANEL: CPT | Performed by: INTERNAL MEDICINE

## 2018-11-26 PROCEDURE — 84443 ASSAY THYROID STIM HORMONE: CPT | Performed by: INTERNAL MEDICINE

## 2018-11-26 PROCEDURE — 36415 COLL VENOUS BLD VENIPUNCTURE: CPT | Performed by: INTERNAL MEDICINE

## 2018-12-12 DIAGNOSIS — E78.5 HYPERLIPIDEMIA LDL GOAL <130: Chronic | ICD-10-CM

## 2018-12-12 NOTE — TELEPHONE ENCOUNTER
"PRAVASTATIN SODIUM 20 MG TAB  Last Written Prescription Date:  06/29/18  Last Fill Quantity: 90,  # refills: 1   Last office visit: 11/20/2018 with prescribing provider:  11/20/18   Future Office Visit:    Requested Prescriptions   Pending Prescriptions Disp Refills     pravastatin (PRAVACHOL) 20 MG tablet [Pharmacy Med Name: PRAVASTATIN SODIUM 20 MG TAB] 90 tablet 1     Sig: TAKE 1 TABLET BY MOUTH EVERY DAY    Statins Protocol Passed - 12/12/2018  2:13 AM       Passed - LDL on file in past 12 months    Recent Labs   Lab Test 11/26/18  0901   *            Passed - No abnormal creatine kinase in past 12 months    No lab results found.            Passed - Recent (12 mo) or future (30 days) visit within the authorizing provider's specialty    Patient had office visit in the last 12 months or has a visit in the next 30 days with authorizing provider or within the authorizing provider's specialty.  See \"Patient Info\" tab in inbasket, or \"Choose Columns\" in Meds & Orders section of the refill encounter.             Passed - Patient is age 18 or older       Passed - No active pregnancy on record       Passed - No positive pregnancy test in past 12 months          "

## 2018-12-13 RX ORDER — PRAVASTATIN SODIUM 20 MG
TABLET ORAL
Qty: 90 TABLET | Refills: 2 | Status: SHIPPED | OUTPATIENT
Start: 2018-12-13 | End: 2019-09-05

## 2018-12-17 ENCOUNTER — HOSPITAL ENCOUNTER (OUTPATIENT)
Dept: CARDIOLOGY | Facility: CLINIC | Age: 78
Discharge: HOME | End: 2018-12-17
Attending: INTERNAL MEDICINE | Admitting: INTERNAL MEDICINE
Payer: MEDICARE

## 2018-12-17 DIAGNOSIS — R01.1 SYSTOLIC EJECTION MURMUR: ICD-10-CM

## 2018-12-17 PROCEDURE — 25500064 ZZH RX 255 OP 636: Performed by: INTERNAL MEDICINE

## 2018-12-17 PROCEDURE — 93306 TTE W/DOPPLER COMPLETE: CPT | Mod: 26 | Performed by: INTERNAL MEDICINE

## 2018-12-17 PROCEDURE — 93306 TTE W/DOPPLER COMPLETE: CPT

## 2018-12-17 RX ADMIN — HUMAN ALBUMIN MICROSPHERES AND PERFLUTREN 9 ML: 10; .22 INJECTION, SOLUTION INTRAVENOUS at 08:36

## 2018-12-18 ENCOUNTER — TELEPHONE (OUTPATIENT)
Dept: FAMILY MEDICINE | Facility: CLINIC | Age: 78
End: 2018-12-18

## 2018-12-18 NOTE — TELEPHONE ENCOUNTER
"          What does she not understand about my my chart message from yesterday?                    There is basically no \"medical jargon\" in the  My Chart message which I sent to her yesterday.                     Please get more information regarding this issue.         "

## 2018-12-18 NOTE — TELEPHONE ENCOUNTER
Patient returned call. Nurse advised her that she doesn't need to restrict activity, and will have follow up ECHO in 1-2 years. No further questions or concerns

## 2018-12-18 NOTE — TELEPHONE ENCOUNTER
Patient Contact    Attempt # 1    Was call answered?  No.  Left message on voicemail with information to call triage back.

## 2018-12-18 NOTE — TELEPHONE ENCOUNTER
Reason for Call:  Other note    Detailed comments: Patient wants a little note explaining her echocardiogram. She didn't understand the medical jargon. Wantsto make sure there is nothing she needs to do until she sees Dr Baptiste again in a year. Patient states she doesn't need a call just send her a little note    Phone Number Patient can be reached at: Home number on file [phone] (home)    Best Time: N/A    Can we leave a detailed message on this number? NO    Call taken on 12/18/2018 at 2:33 PM by ANNIE SAPP

## 2018-12-18 NOTE — TELEPHONE ENCOUNTER
Patient didn't understand the medical jargon regarding her echocardiogram. Wants to make sure there is nothing she needs to do until she sees Dr Baptiste again in a year. Patient states she doesn't need a call just send her a little note via [employer]. Please review and advise.

## 2019-01-17 ENCOUNTER — TRANSFERRED RECORDS (OUTPATIENT)
Dept: HEALTH INFORMATION MANAGEMENT | Facility: CLINIC | Age: 79
End: 2019-01-17

## 2019-05-28 ENCOUNTER — OFFICE VISIT (OUTPATIENT)
Dept: FAMILY MEDICINE | Facility: CLINIC | Age: 79
End: 2019-05-28
Payer: COMMERCIAL

## 2019-05-28 VITALS
TEMPERATURE: 98.2 F | WEIGHT: 158.5 LBS | HEART RATE: 74 BPM | OXYGEN SATURATION: 99 % | HEIGHT: 58 IN | RESPIRATION RATE: 13 BRPM | DIASTOLIC BLOOD PRESSURE: 70 MMHG | BODY MASS INDEX: 33.27 KG/M2 | SYSTOLIC BLOOD PRESSURE: 124 MMHG

## 2019-05-28 DIAGNOSIS — L30.9 PERIORBITAL DERMATITIS: Primary | ICD-10-CM

## 2019-05-28 DIAGNOSIS — L98.9 SKIN LESION: ICD-10-CM

## 2019-05-28 DIAGNOSIS — M85.89 OSTEOPENIA OF MULTIPLE SITES: ICD-10-CM

## 2019-05-28 PROCEDURE — 99213 OFFICE O/P EST LOW 20 MIN: CPT | Performed by: INTERNAL MEDICINE

## 2019-05-28 ASSESSMENT — MIFFLIN-ST. JEOR: SCORE: 1098.7

## 2019-05-28 NOTE — PROGRESS NOTES
Subjective     Caren Eid is a 78 year old female who presents to clinic today for the following health issues:    HPI   Derm issue       Duration: Spot on back on back many yrs. Rash around eyes 2 weeks. Also concerns about spot behind right ear      Description  Location: Spot on back & rash around both eye   Itching: mild    Intensity:  mild    Accompanying signs and symptoms: None    History (similar episodes/previous evaluation): None     Precipitating or alleviating factors:  New exposures:  None  Recent travel: YES- [place], returned a couple of days ago      Therapies tried and outcome: Hydrocortone cream- no improvement       Bought some 1% HC cream for the periorbital rash.    wants to schedule a DXA.             Current Outpatient Medications   Medication Sig Dispense Refill     alendronate (FOSAMAX) 70 MG tablet TAKE 1 TABLET BY MOUTH EVERY 7 DAYS. TAKE 60 MIN BEFORE AM MEAL WITH 8OZ WATER. STAY UPRIGHT X30MIN. 12 tablet 3     aspirin 81 MG EC tablet Take 1 tablet (81 mg) by mouth daily 90 tablet 3     Calcium Carbonate-Vit D-Min (CALCIUM 600 + MINERALS PO) Take  by mouth.       Glucosamine-Chondroitin (GLUCOSAMINE CHONDR COMPLEX PO) Take  by mouth.       levothyroxine (SYNTHROID/LEVOTHROID) 75 MCG tablet Take 1 tablet (75 mcg) by mouth daily 90 tablet 2     Omega-3 Fatty Acids (OMEGA 3 PO) Take  by mouth.       pantoprazole (PROTONIX) 20 MG EC tablet Take by mouth 30-60 minutes before a meal. 90 tablet 3     pravastatin (PRAVACHOL) 20 MG tablet TAKE 1 TABLET BY MOUTH EVERY DAY 90 tablet 2     Cholecalciferol (VITAMIN D) 2000 UNITS CAPS Take 1 capful by mouth daily       No Known Allergies  BP Readings from Last 3 Encounters:   05/28/19 124/70   11/20/18 130/70   11/14/17 122/64    Wt Readings from Last 3 Encounters:   05/28/19 71.9 kg (158 lb 8 oz)   11/20/18 71.2 kg (157 lb)   11/14/17 69.9 kg (154 lb)                             Wants/needs a new Dermatology provider.                    "    Reviewed and updated as needed this visit by Provider         Review of Systems   ROS COMP: CONSTITUTIONAL:NEGATIVE for fever, chills, change in weight      Objective    /70   Pulse 74   Temp 98.2  F (36.8  C) (Tympanic)   Resp 13   Ht 1.473 m (4' 10\")   Wt 71.9 kg (158 lb 8 oz)   SpO2 99%   BMI 33.13 kg/m    There is no height or weight on file to calculate BMI.  Physical Exam   GENERAL: alert, no distress and over weight  SKIN: oval pink lesion mid back.  Faint periorbital rash    Diagnostic Test Results:  none         Assessment & Plan     Caren was seen today for derm problem.    Diagnoses and all orders for this visit:    Periorbital dermatitis  -     DERMATOLOGY REFERRAL    Skin lesion  Comments:  lower back  Orders:  -     DERMATOLOGY REFERRAL    Osteopenia of multiple sites  -     DX Hip/Pelvis/Spine; Future         BMI:   Estimated body mass index is 33.13 kg/m  as calculated from the following:    Height as of this encounter: 1.473 m (4' 10\").    Weight as of this encounter: 71.9 kg (158 lb 8 oz).   Weight management plan: Discussed healthy diet and exercise guidelines        Patient Instructions   Plan on using the hydrocortisone cream 4 times per day for 2 weeks.              Plan to see Dermatology for a possible biopsy of the lesion on your back.                                    Schedule a bone density in October or later.         Return in about 6 months (around 11/26/2019) for yearly wellness visit.    Rajinder Baptiste MD  [facility]      "

## 2019-05-28 NOTE — PATIENT INSTRUCTIONS
Plan on using the hydrocortisone cream 4 times per day for 2 weeks.              Plan to see Dermatology for a possible biopsy of the lesion on your back.                                    Schedule a bone density in October or later.

## 2019-06-18 ENCOUNTER — ANCILLARY PROCEDURE (OUTPATIENT)
Dept: MAMMOGRAPHY | Facility: CLINIC | Age: 79
End: 2019-06-18
Attending: INTERNAL MEDICINE
Payer: COMMERCIAL

## 2019-06-18 DIAGNOSIS — Z12.31 VISIT FOR SCREENING MAMMOGRAM: ICD-10-CM

## 2019-06-18 PROCEDURE — 77067 SCR MAMMO BI INCL CAD: CPT | Mod: TC

## 2019-07-01 ENCOUNTER — OFFICE VISIT (OUTPATIENT)
Dept: DERMATOLOGY | Facility: CLINIC | Age: 79
End: 2019-07-01
Payer: COMMERCIAL

## 2019-07-01 VITALS — HEART RATE: 73 BPM | SYSTOLIC BLOOD PRESSURE: 145 MMHG | DIASTOLIC BLOOD PRESSURE: 83 MMHG

## 2019-07-01 DIAGNOSIS — L82.1 SEBORRHEIC KERATOSES: ICD-10-CM

## 2019-07-01 DIAGNOSIS — L73.9 FOLLICULITIS: Primary | ICD-10-CM

## 2019-07-01 DIAGNOSIS — L82.0 INFLAMED SEBORRHEIC KERATOSIS: ICD-10-CM

## 2019-07-01 PROCEDURE — 17110 DESTRUCTION B9 LES UP TO 14: CPT | Performed by: PHYSICIAN ASSISTANT

## 2019-07-01 PROCEDURE — 99214 OFFICE O/P EST MOD 30 MIN: CPT | Mod: 25 | Performed by: PHYSICIAN ASSISTANT

## 2019-07-01 RX ORDER — BENZOYL PEROXIDE 10 G/100G
SUSPENSION TOPICAL
Qty: 226 G | Refills: 3 | Status: SHIPPED | OUTPATIENT
Start: 2019-07-01

## 2019-07-01 RX ORDER — CLINDAMYCIN PHOSPHATE 10 UG/ML
LOTION TOPICAL
Qty: 60 ML | Refills: 3 | Status: SHIPPED | OUTPATIENT
Start: 2019-07-01
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
## 2019-07-01 NOTE — PATIENT INSTRUCTIONS
Proper skin care from [facility]:    -Eliminate harsh soaps as they strip the natural oils from the skin, often resulting in dry itchy skin ( i.e. Dial, Zest, [place]i Spring)  -Use mild soaps such as Cetaphil or Dove Sensitive Skin in the shower. You do not need to use soap on arms, legs, and trunk every time you shower unless visibly soiled.   -Avoid hot or cold showers.  -After showering, lightly dry off and apply moisturizing within 2-3 minutes. This will help trap moisture in the skin.   -Aggressive use of a moisturizer at least 1-2 times a day to the entire body (including -Vanicream, Cetaphil, Aquaphor or Cerave) and moisturize hands after every washing.  -We recommend using moisturizers that come in a tub that needs to be scooped out, not a pump. This has more of an oil base. It will hold moisture in your skin much better than a water base moisturizer. The above recommended are non-pore clogging.           Wear a sunscreen with at least SPF 30 on your face, ears, neck and V of the chest daily. Wear sunscreen on other areas of the body if those areas are exposed to the sun throughout the day. Sunscreens can contain physical and/or chemical blockers. Physical blockers are less likely to clog pores, these include zinc oxide and titanium dioxide. Reapply every two hour and after swimming. Sunscreen examples include Neutrogena, CeraVe, Blue Lizard, Elta MD and many others.    UV radiation  UVA radiation remains constant throughout the day and throughout the year. It is a longer wavelength than UVB and therefore penetrates deeper into the skin leading to immediate and delayed tanning, photoaging, and skin cancer. 70-80% of UVA and UVB radiation occurs between the hours of 10am-2pm.  UVB radiation  UVB radiation causes the most harmful effects and is more significant during the summer months. However, snow and ice can reflect UVB radiation leading to skin damage during the winter months as well. UVB  radiation is responsible for tanning, burning, inflammation, delayed erythema (pinkness), pigmentation (brown spots), and skin cancer.     2) folliculitis of scalp area    Begin benzoyl peroxide wash daily. Rinse well, can bleach fabrics  Begin clindamycin 2x a day      Treated inflamed seborrheic keratoses with liquid nitrogen  WOUND CARE INSTRUCTIONS  FOR CRYOSURGERY        This area treated with liquid nitrogen will form a blister. You do not need to bandage the area until after the blister forms and breaks (which may be a few days).  When the blister breaks, begin daily dressing changes as follows:    1) Clean and dry the area with tap water using clean Q-tip or sterile gauze pad.    2) Apply Polysporin ointment or Bacitracin ointment over entire wound.  Do NOT use Neosporin ointment.    3) Cover the wound with a band-aid or sterile non-stick gauze pad and micropore paper tape.      REPEAT THESE INSTRUCTIONS AT LEAST ONCE A DAY UNTIL THE WOUND HAS COMPLETELY HEALED.        It is an old wives tale that a wound heals better when it is exposed to air and allowed to dry out. The wound will heal faster with a better cosmetic result if it is kept moist with ointment and covered with a bandage.  Do not let the wound dry out.      Supplies Needed:     *Cotton tipped applicators (Q-tips)   *Polysporin ointment or Bacitracin ointment (NOT NEOSPORIN)   *Band-aids, or non stick gauze pads and micropore paper tape    PATIENT INFORMATION    During the healing process you will notice a number of changes. All wounds develop a small halo of redness surrounding the wound.  This means healing is occurring. Severe itching with extensive redness usually indicates sensitivity to the ointment or bandage tape used to dress the wound.  You should call our office if this develops.      Swelling and/or discoloration around your surgical site is common, particularly when performed around the eye.    All wounds normally drain.  The larger  the wound the more drainage there will be.  After 7-10 days, you will notice the wound beginning to shrink and new skin will begin to grow.  The wound is healed when you can see skin has formed over the entire area.  A healed wound has a healthy, shiny look to the surface and is red to dark pink in color to normalize.  Wounds may take approximately 4-6 weeks to heal.  Larger wounds may take 6-8 weeks.  After the wound is healed you may discontinue dressing changes.    You may experience a sensation of tightness as your wound heals. This is normal and will gradually subside.    Your healed wound may be sensitive to temperature changes. This sensitivity improves with time, but if you re having a lot of discomfort, try to avoid temperature extremes.    Patients frequently experience itching after their wound appears to have healed because of the continue healing under the skin.  Plain Vaseline will help relieve the itching.

## 2019-07-03 ENCOUNTER — TELEPHONE (OUTPATIENT)
Dept: DERMATOLOGY | Facility: CLINIC | Age: 79
End: 2019-07-03

## 2019-07-03 DIAGNOSIS — M85.89 OSTEOPENIA OF MULTIPLE SITES: ICD-10-CM

## 2019-07-03 RX ORDER — ALENDRONATE SODIUM 70 MG/1
TABLET ORAL
Qty: 12 TABLET | Refills: 3 | Status: SHIPPED | OUTPATIENT
Start: 2019-07-03 | End: 2019-10-16

## 2019-07-03 NOTE — TELEPHONE ENCOUNTER
Seen 07/1/19:    I was asked to see pt by Dr. Baptiste. Caren Eid is a 78 year old female patient here today for spots on back, left breast and upper eyelids .  Patient states this has been present for a while.  Patient reports the following symptoms: irritation .  Patient reports the following previous treatments: none.  Patient reports the following modifying factors: none.  Associated symptoms: none.  Patient has no other skin complaints today.  Remainder of the HPI, Meds, PMH, Allergies, FH, and SH was reviewed in chart.    Pertinent Hx:   No personal or family history of skin cancer

## 2019-07-03 NOTE — TELEPHONE ENCOUNTER
"ALENDRONATE SODIUM 70 MG TAB  Last Written Prescription Date:  08/09/2018  Last Fill Quantity: 12,  # refills: 3   Last office visit: 5/28/2019 with prescribing provider:  05/28/2019   Future Office Visit:    Requested Prescriptions   Pending Prescriptions Disp Refills     alendronate (FOSAMAX) 70 MG tablet [Pharmacy Med Name: ALENDRONATE SODIUM 70 MG TAB] 12 tablet 3     Sig: TAKE 1 TABLET BY MOUTH EVERY 7 DAYS. TAKE 60 MIN BEFORE AM MEAL WITH 8OZ WATER. STAY UPRIGHT X30MIN.       Bisphosphonates Passed - 7/3/2019  3:01 AM        Passed - Recent (12 mo) or future (30 days) visit within the authorizing provider's specialty     Patient had office visit in the last 12 months or has a visit in the next 30 days with authorizing provider or within the authorizing provider's specialty.  See \"Patient Info\" tab in inbasket, or \"Choose Columns\" in Meds & Orders section of the refill encounter.              Passed - Dexa on file within past 2 years     Please review last Dexa result.           Passed - Medication is active on med list        Passed - Patient is age 18 or older        Passed - Normal serum creatinine on file within past 12 months     Recent Labs   Lab Test 11/26/18  0901   CR 1.01               "

## 2019-07-03 NOTE — TELEPHONE ENCOUNTER
"4 inflamed excoriated papules on right occipital scalp/neck    Apply clinda bid to \"pimples\" on right side of scalp and neck    Wash area daily in the shower with BPO wash.  "

## 2019-07-03 NOTE — TELEPHONE ENCOUNTER
Reason for Call:  Other prescription - application issues/where?    Detailed comments: the patient was prescribed this lotion - clindamycin (CLEOCIN T) 1 % external lotion - and she has further questions as to what areas she is supposed to apply the cream, she knows to apply the lotion bid, but wants to know what areas, if her eyes are included too?  It is okay to leave a detailed message if she doesn't answer.    Phone Number Patient can be reached at: Cell number on file:    Telephone Information:   Mobile [phone]     Best Time: anytime    Can we leave a detailed message on this number? YES    Call taken on 7/3/2019 at 1:22 PM by Angelina Neal

## 2019-07-03 NOTE — TELEPHONE ENCOUNTER
Patient notified providers message.    Liane Hernandez,RN BSN  [facility]  [phone]

## 2019-09-05 DIAGNOSIS — E78.5 HYPERLIPIDEMIA LDL GOAL <130: Chronic | ICD-10-CM

## 2019-09-05 NOTE — TELEPHONE ENCOUNTER
"Requested Prescriptions   Pending Prescriptions Disp Refills     pravastatin (PRAVACHOL) 20 MG tablet [Pharmacy Med Name: PRAVASTATIN SODIUM 20 MG TAB]  Last Written Prescription Date:  12/13/2018  Last Fill Quantity: 90 tablet,  # refills: 2   Last office visit: 5/28/2019 with prescribing provider:  Oliva   Future Office Visit:     90 tablet 2     Sig: TAKE 1 TABLET BY MOUTH EVERY DAY       Statins Protocol Passed - 9/5/2019  1:23 AM        Passed - LDL on file in past 12 months     Recent Labs   Lab Test 11/26/18  0901   *             Passed - No abnormal creatine kinase in past 12 months     No lab results found.             Passed - Recent (12 mo) or future (30 days) visit within the authorizing provider's specialty     Patient had office visit in the last 12 months or has a visit in the next 30 days with authorizing provider or within the authorizing provider's specialty.  See \"Patient Info\" tab in inbasket, or \"Choose Columns\" in Meds & Orders section of the refill encounter.              Passed - Medication is active on med list        Passed - Patient is age 18 or older        Passed - No active pregnancy on record        Passed - No positive pregnancy test in past 12 months           "

## 2019-09-09 RX ORDER — PRAVASTATIN SODIUM 20 MG
TABLET ORAL
Qty: 90 TABLET | Refills: 0 | Status: SHIPPED | OUTPATIENT
Start: 2019-09-09 | End: 2019-12-12 | Stop reason: DRUGHIGH

## 2019-09-17 ENCOUNTER — OFFICE VISIT (OUTPATIENT)
Dept: FAMILY MEDICINE | Facility: CLINIC | Age: 79
End: 2019-09-17
Payer: COMMERCIAL

## 2019-09-17 VITALS
OXYGEN SATURATION: 98 % | RESPIRATION RATE: 20 BRPM | HEART RATE: 75 BPM | TEMPERATURE: 97.5 F | BODY MASS INDEX: 35.48 KG/M2 | HEIGHT: 58 IN | SYSTOLIC BLOOD PRESSURE: 130 MMHG | DIASTOLIC BLOOD PRESSURE: 70 MMHG | WEIGHT: 169 LBS

## 2019-09-17 DIAGNOSIS — R10.13 EPIGASTRIC PAIN: Primary | ICD-10-CM

## 2019-09-17 DIAGNOSIS — M85.89 OSTEOPENIA OF MULTIPLE SITES: ICD-10-CM

## 2019-09-17 DIAGNOSIS — R41.3 MEMORY LOSS: ICD-10-CM

## 2019-09-17 PROBLEM — E66.01 MORBID OBESITY (H): Status: ACTIVE | Noted: 2019-09-17

## 2019-09-17 PROCEDURE — 99214 OFFICE O/P EST MOD 30 MIN: CPT | Performed by: INTERNAL MEDICINE

## 2019-09-17 ASSESSMENT — MIFFLIN-ST. JEOR: SCORE: 1146.33

## 2019-09-17 NOTE — PROGRESS NOTES
"Subjective     Caren Eid is a 78 year old female who presents to clinic today for the following health issues:    HPI   Concern - epigastric pain  Onset: several months    Description:   Constant aching pain    Intensity: moderate    Progression of Symptoms:  same and constant    Accompanying Signs & Symptoms:  nothing    Previous history of similar problem:   no    Precipitating factors:   Worsened by: nothing    Alleviating factors:  Improved by: nothing    Therapies Tried and outcome: Protonix, but not helping           She complains of epigastric discomfort. She points to the xiphoid process area.             She has a history of this dating back to at least 2016.      She does not recall that  Her symptoms started so long ago, nor that she had a full evaluation including an endoscopy, lab work, and a CT. She was seen by GI at that time.     She went to see GI again this January with recurrent symptoms. Protonix was restarted.         GI note reviewed with pt; 1/17/19.            PPI not helping.        Does not think alendronate is causing problems.                 She has gained weight.                                              She still works. She admits to having some memory problems.                       Patient Active Problem List    Diagnosis Date Noted     Aortic valve stenosis, etiology of cardiac valve disease unspecified 12/17/2018     Priority: Medium     Mild on echocardiogram in 12/18.  Recheck in 1 or 2 years       Family history of malignant neoplasm of breast 11/20/2018     Priority: Medium     mother       Systolic ejection murmur 11/20/2018     Priority: Medium     Osteopenia of multiple sites 11/20/2018     Priority: Medium     Gastroesophageal reflux disease without esophagitis 08/23/2017     Priority: Medium     Hypothyroidism, unspecified type 07/13/2016     Priority: Medium     Abdominal pain, epigastric 02/18/2016     Priority: Medium     labs are normal; US \"borderline " "prominent pancreatic duct\"; CT suggests this is likely WNL;        EGD neg;  PPI for 3 months advised by GI       Pancreatic duct dilated 02/18/2016     Priority: Medium     CT neg except \"mildly prominent pancreatic duct\" \"likely WNL.           RTP.        Osteopenia 08/14/2015     Priority: Medium     In 8/15, +0.9 spine, -1.2 L femoral neck, -1.3 R                    In 9/17, T+1 spine, -1.9 L femoral neck, -1.2 R;    Start alendronate       Personal history of tobacco use, presenting hazards to health 07/07/2015     Priority: Medium     Quit first in 1978; then smoked for 1 yr in 1988       Interstitial cystitis 07/07/2015     Priority: Medium     Dx by urology 2014; Dr Onofre       Plantar fasciitis 02/11/2013     Priority: Medium     Somatic dysfunction of thoracic region 02/11/2013     Priority: Medium     Somatic dysfunction of lumbar region 02/11/2013     Priority: Medium     Hyperlipidemia LDL goal <130 12/13/2012     Priority: Medium     Vitamin D deficiency 12/13/2012     Priority: Medium     22 in 3/15       Segmental and somatic dysfunction of rib cage 12/13/2012     Priority: Medium     Somatic dysfunction of lower extremities 12/13/2012     Priority: Medium     Somatic dysfunction of spine affecting head region 12/13/2012     Priority: Medium     Somatic dysfunction of cervical region 10/19/2012     Priority: Medium     Somatic dysfunction of sacral region 10/19/2012     Priority: Medium     Somatic dysfunction of pelvic region 10/19/2012     Priority: Medium     Somatic dysfunction of spine, lumbar 10/19/2012     Priority: Medium     Somatic dysfunction of spine, thoracic 10/19/2012     Priority: Medium     Somatic dysfunction of upper extremities 10/19/2012     Priority: Medium     Somatic dysfunction of spine, cervical 10/19/2012     Priority: Medium     Somatic dysfunction of spine affecting pelvic region 10/19/2012     Priority: Medium     Back pain 10/19/2012     Priority: Medium     " Cervicalgia 10/13/2012     Priority: Medium     Preventive measure 07/07/2015     Priority: Low     Pap 12/11                            Colonoscopy 11/07; 2/18, 2 tiny adenomas; recheck in 5 yrs advised  Mammogram 3/16, 4/17, 5/18, 6/19         ACP (advance care planning) 11/20/2014     Priority: Low     Advance Care Planning: Receipt of ACP document:  Received: Health Care Directive and Health Care Power of  which were witnessed or notarized on 11-24-11.  Document previously scanned on 1-18-12 in [facility] and 10-17-12 in epic.  Validation form completed and sent to be scanned.  Code Status needs to be updated to reflect choices in most recent ACP document.  Confirmed/documented designated decision maker(s). See permanent comments section of demographics in clinical tab. View document(s) and details by clicking on code status. Added by Monisha Peñaloza RN, System ACP Coordinator [employer] on 12/30/2014.             Past Surgical History:   Procedure Laterality Date     APPENDECTOMY  Age 12     COLONOSCOPY  11-20-07    Repeat Colonoscopy in 10 yrs.     D & C  1980     HC TOOTH EXTRACTION W/FORCEP  Age 22's - 2 of them     SURGERY GENERAL IP CONSULT  1983 benign tumor in neck       Current Outpatient Medications   Medication Sig Dispense Refill     alendronate (FOSAMAX) 70 MG tablet TAKE 1 TABLET BY MOUTH EVERY 7 DAYS. TAKE 60 MIN BEFORE AM MEAL WITH 8OZ WATER. STAY UPRIGHT X30MIN. 12 tablet 3     aspirin 81 MG EC tablet Take 1 tablet (81 mg) by mouth daily 90 tablet 3     benzoyl peroxide (BENZOYL PEROXIDE WASH) 10 % external liquid Wash affected area once a day in the shower 226 g 3     Calcium Carbonate-Vit D-Min (CALCIUM 600 + MINERALS PO) Take  by mouth.       Cholecalciferol (VITAMIN D) 2000 UNITS CAPS Take 1 capful by mouth daily       clindamycin (CLEOCIN T) 1 % external lotion Apply to area affected BID 60 mL 3     Glucosamine-Chondroitin (GLUCOSAMINE CHONDR COMPLEX PO) Take  by mouth.        "levothyroxine (SYNTHROID/LEVOTHROID) 75 MCG tablet Take 1 tablet (75 mcg) by mouth daily 90 tablet 2     Omega-3 Fatty Acids (OMEGA 3 PO) Take  by mouth.       pantoprazole (PROTONIX) 20 MG EC tablet Take by mouth 30-60 minutes before a meal. 90 tablet 3     pravastatin (PRAVACHOL) 20 MG tablet TAKE 1 TABLET BY MOUTH EVERY DAY 90 tablet 0     No Known Allergies  BP Readings from Last 3 Encounters:   09/17/19 130/70   07/01/19 145/83   05/28/19 124/70    Wt Readings from Last 3 Encounters:   09/17/19 76.7 kg (169 lb)   05/28/19 71.9 kg (158 lb 8 oz)   11/20/18 71.2 kg (157 lb)                      Reviewed and updated as needed this visit by Provider         Review of Systems   ROS COMP: CONSTITUTIONAL:POSITIVE  for weight gain and NEGATIVE  for chills and fever   RESP:NEGATIVE for significant cough or SOB  CV: NEGATIVE for chest pain, palpitations or peripheral edema  GI: NEGATIVE for hematochezia, melena, nausea and poor appetite      Objective    /70 (BP Location: Left arm, Patient Position: Chair, Cuff Size: Adult Regular)   Pulse 75   Temp 97.5  F (36.4  C)   Resp 20   Ht 1.473 m (4' 10\")   Wt 76.7 kg (169 lb)   SpO2 98%   Breastfeeding? No   BMI 35.32 kg/m    Body mass index is 35.32 kg/m .  Physical Exam   GENERAL APPEARANCE: alert, no distress and over weight  RESP: lungs clear to auscultation - no rales, rhonchi or wheezes  CV: regular rates and rhythm, normal S1 S2, no S3 or S4 and no murmur, click or rub  ABDOMEN: no hepatosplenomegaly or masses and Basically nontender, except over the xiphoid process    Diagnostic Test Results:  none         Assessment & Plan     Diagnoses and all orders for this visit:    Epigastric pain    Memory loss    Osteopenia of multiple sites         BMI:   Estimated body mass index is 35.32 kg/m  as calculated from the following:    Height as of this encounter: 1.473 m (4' 10\").    Weight as of this encounter: 76.7 kg (169 lb).   Weight management plan: Discussed " healthy diet and exercise guidelines        Summary and implications:  We reviewed multiple issues.           We reviewed all of the issues on the diagnoses list.                    She also has a bone density scheduled for next month.  Patient Instructions   Let's try this: get some Tylenol arthritis strength, and take 2 of these twice per day.      Do not take the alendronate once per week until we communicate again.          There is a chance that this is aggravating your upper abdominal discomfort.                    It seems unlikely that anything real serious is happening.              We can do a CT if your symptoms worsen or do not improve.                                                   Let me know in a month or so, on My Chart regarding how you are doing.                      Return if symptoms worsen or fail to improve.    Rajinder Baptiste MD  [facility]

## 2019-09-17 NOTE — PATIENT INSTRUCTIONS
Let's try this: get some Tylenol arthritis strength, and take 2 of these twice per day.      Do not take the alendronate once per week until we communicate again.          There is a chance that this is aggravating your upper abdominal discomfort.                    It seems unlikely that anything real serious is happening.              We can do a CT if your symptoms worsen or do not improve.                                                   Let me know in a month or so, on My Chart regarding how you are doing.

## 2019-10-03 ENCOUNTER — HEALTH MAINTENANCE LETTER (OUTPATIENT)
Age: 79
End: 2019-10-03

## 2019-10-07 ENCOUNTER — ANCILLARY PROCEDURE (OUTPATIENT)
Dept: BONE DENSITY | Facility: CLINIC | Age: 79
End: 2019-10-07
Attending: INTERNAL MEDICINE
Payer: COMMERCIAL

## 2019-10-07 DIAGNOSIS — M85.89 OSTEOPENIA OF MULTIPLE SITES: ICD-10-CM

## 2019-10-07 PROCEDURE — 77080 DXA BONE DENSITY AXIAL: CPT | Performed by: FAMILY MEDICINE

## 2019-10-16 ENCOUNTER — MYC MEDICAL ADVICE (OUTPATIENT)
Dept: FAMILY MEDICINE | Facility: CLINIC | Age: 79
End: 2019-10-16

## 2019-11-09 DIAGNOSIS — E03.9 HYPOTHYROIDISM, UNSPECIFIED TYPE: ICD-10-CM

## 2019-11-09 NOTE — TELEPHONE ENCOUNTER
"Requested Prescriptions   Pending Prescriptions Disp Refills     levothyroxine (SYNTHROID/LEVOTHROID) 75 MCG tablet [Pharmacy Med Name: LEVOTHYROXINE 75 MCG TABLET]    Last Written Prescription Date:  02/12/2019  Last Fill Quantity: 90 tablet,  # refills: 2   Last office visit: 9/17/2019 with prescribing provider:  Oliva   Future Office Visit:     Next 5 appointments (look out 90 days)    Nov 27, 2019  9:30 AM CST  PHYSICAL with Rajinder Baptiste MD  [facility] ([facility]) [address] [place] [id number]  [phone]          90 tablet 2     Sig: TAKE 1 TABLET BY MOUTH EVERY DAY       Thyroid Protocol Passed - 11/9/2019  8:53 AM        Passed - Patient is 12 years or older        Passed - Recent (12 mo) or future (30 days) visit within the authorizing provider's specialty     Patient has had an office visit with the authorizing provider or a provider within the authorizing providers department within the previous 12 mos or has a future within next 30 days. See \"Patient Info\" tab in inbasket, or \"Choose Columns\" in Meds & Orders section of the refill encounter.              Passed - Medication is active on med list        Passed - Normal TSH on file in past 12 months     Recent Labs   Lab Test 11/26/18  0901   TSH 0.83              Passed - No active pregnancy on record     If patient is pregnant or has had a positive pregnancy test, please check TSH.          Passed - No positive pregnancy test in past 12 months     If patient is pregnant or has had a positive pregnancy test, please check TSH.             "

## 2019-11-11 RX ORDER — LEVOTHYROXINE SODIUM 75 UG/1
TABLET ORAL
Qty: 90 TABLET | Refills: 0 | Status: SHIPPED | OUTPATIENT
Start: 2019-11-11 | End: 2020-03-04

## 2019-11-22 DIAGNOSIS — E03.9 HYPOTHYROIDISM, UNSPECIFIED TYPE: ICD-10-CM

## 2019-11-22 RX ORDER — LEVOTHYROXINE SODIUM 75 UG/1
TABLET ORAL
Qty: 90 TABLET | Refills: 0 | OUTPATIENT
Start: 2019-11-22

## 2019-11-22 NOTE — TELEPHONE ENCOUNTER
"Requested Prescriptions   Pending Prescriptions Disp Refills     levothyroxine (SYNTHROID/LEVOTHROID) 75 MCG tablet [Pharmacy Med Name: LEVOTHYROXINE 75 MCG TABLET]  Last Written Prescription Date:  11/11/2019  Last Fill Quantity: 90 tablet,  # refills: 0   Last office visit: 9/17/2019 with prescribing provider:  Oliva   Future Office Visit:   Next 5 appointments (look out 90 days)    Nov 27, 2019  9:30 AM CST  PHYSICAL with Rajinder Baptiste MD  [facility] ([facility]) [address] [place] [id number]  [phone]          90 tablet 0     Sig: TAKE 1 TABLET BY MOUTH EVERY DAY       Thyroid Protocol Passed - 11/22/2019  9:22 AM        Passed - Patient is 12 years or older        Passed - Recent (12 mo) or future (30 days) visit within the authorizing provider's specialty     Patient has had an office visit with the authorizing provider or a provider within the authorizing providers department within the previous 12 mos or has a future within next 30 days. See \"Patient Info\" tab in inbasket, or \"Choose Columns\" in Meds & Orders section of the refill encounter.              Passed - Medication is active on med list        Passed - Normal TSH on file in past 12 months     Recent Labs   Lab Test 11/26/18  0901   TSH 0.83              Passed - No active pregnancy on record     If patient is pregnant or has had a positive pregnancy test, please check TSH.          Passed - No positive pregnancy test in past 12 months     If patient is pregnant or has had a positive pregnancy test, please check TSH.             "

## 2019-12-07 DIAGNOSIS — E78.5 HYPERLIPIDEMIA LDL GOAL <130: Chronic | ICD-10-CM

## 2019-12-07 NOTE — TELEPHONE ENCOUNTER
"Requested Prescriptions   Pending Prescriptions Disp Refills     pravastatin (PRAVACHOL) 20 MG tablet [Pharmacy Med Name: PRAVASTATIN SODIUM 20 MG TAB]    Last Written Prescription Date:  09/09/2019  Last Fill Quantity: 90 tablet,  # refills: 0   Last office visit: 9/17/2019 with prescribing provider:  Oliva   Future Office Visit:     Next 5 appointments (look out 90 days)    Dec 10, 2019  2:30 PM CST  PHYSICAL with Rajinder Baptiste MD  [facility] ([facility]) [address] [place] [id number]  [phone]          90 tablet 0     Sig: TAKE 1 TABLET BY MOUTH EVERY DAY       Statins Protocol Failed - 12/7/2019  9:07 AM        Failed - LDL on file in past 12 months     Recent Labs   Lab Test 11/26/18  0901   *             Passed - No abnormal creatine kinase in past 12 months     No lab results found.             Passed - Recent (12 mo) or future (30 days) visit within the authorizing provider's specialty     Patient has had an office visit with the authorizing provider or a provider within the authorizing providers department within the previous 12 mos or has a future within next 30 days. See \"Patient Info\" tab in inbasket, or \"Choose Columns\" in Meds & Orders section of the refill encounter.              Passed - Medication is active on med list        Passed - Patient is age 18 or older        Passed - No active pregnancy on record        Passed - No positive pregnancy test in past 12 months           "

## 2019-12-08 PROBLEM — I35.0 AORTIC VALVE STENOSIS, ETIOLOGY OF CARDIAC VALVE DISEASE UNSPECIFIED: Status: ACTIVE | Noted: 2018-12-17

## 2019-12-10 ENCOUNTER — OFFICE VISIT (OUTPATIENT)
Dept: FAMILY MEDICINE | Facility: CLINIC | Age: 79
End: 2019-12-10
Payer: COMMERCIAL

## 2019-12-10 VITALS
BODY MASS INDEX: 33.06 KG/M2 | OXYGEN SATURATION: 100 % | SYSTOLIC BLOOD PRESSURE: 122 MMHG | TEMPERATURE: 97.4 F | HEIGHT: 59 IN | HEART RATE: 75 BPM | RESPIRATION RATE: 16 BRPM | DIASTOLIC BLOOD PRESSURE: 76 MMHG | WEIGHT: 164 LBS

## 2019-12-10 DIAGNOSIS — R41.3 MEMORY LOSS: ICD-10-CM

## 2019-12-10 DIAGNOSIS — E03.9 HYPOTHYROIDISM, UNSPECIFIED TYPE: ICD-10-CM

## 2019-12-10 DIAGNOSIS — H91.93 BILATERAL HEARING LOSS, UNSPECIFIED HEARING LOSS TYPE: ICD-10-CM

## 2019-12-10 DIAGNOSIS — I35.0 AORTIC VALVE STENOSIS, ETIOLOGY OF CARDIAC VALVE DISEASE UNSPECIFIED: ICD-10-CM

## 2019-12-10 DIAGNOSIS — Z00.00 ROUTINE HISTORY AND PHYSICAL EXAMINATION OF ADULT: Primary | ICD-10-CM

## 2019-12-10 DIAGNOSIS — K21.9 GASTROESOPHAGEAL REFLUX DISEASE WITHOUT ESOPHAGITIS: ICD-10-CM

## 2019-12-10 DIAGNOSIS — E78.5 HYPERLIPIDEMIA LDL GOAL <130: ICD-10-CM

## 2019-12-10 DIAGNOSIS — M72.2 PLANTAR FASCIITIS: ICD-10-CM

## 2019-12-10 LAB
BASOPHILS # BLD AUTO: 0 10E9/L (ref 0–0.2)
BASOPHILS NFR BLD AUTO: 0.3 %
DIFFERENTIAL METHOD BLD: NORMAL
EOSINOPHIL # BLD AUTO: 0.1 10E9/L (ref 0–0.7)
EOSINOPHIL NFR BLD AUTO: 1.8 %
ERYTHROCYTE [DISTWIDTH] IN BLOOD BY AUTOMATED COUNT: 13.2 % (ref 10–15)
HCT VFR BLD AUTO: 40.3 % (ref 35–47)
HGB BLD-MCNC: 13.2 G/DL (ref 11.7–15.7)
LYMPHOCYTES # BLD AUTO: 2.2 10E9/L (ref 0.8–5.3)
LYMPHOCYTES NFR BLD AUTO: 30.4 %
MCH RBC QN AUTO: 29.4 PG (ref 26.5–33)
MCHC RBC AUTO-ENTMCNC: 32.8 G/DL (ref 31.5–36.5)
MCV RBC AUTO: 90 FL (ref 78–100)
MONOCYTES # BLD AUTO: 0.5 10E9/L (ref 0–1.3)
MONOCYTES NFR BLD AUTO: 7.5 %
NEUTROPHILS # BLD AUTO: 4.3 10E9/L (ref 1.6–8.3)
NEUTROPHILS NFR BLD AUTO: 60 %
PLATELET # BLD AUTO: 335 10E9/L (ref 150–450)
RBC # BLD AUTO: 4.49 10E12/L (ref 3.8–5.2)
WBC # BLD AUTO: 7.2 10E9/L (ref 4–11)

## 2019-12-10 PROCEDURE — 80061 LIPID PANEL: CPT | Performed by: INTERNAL MEDICINE

## 2019-12-10 PROCEDURE — 99397 PER PM REEVAL EST PAT 65+ YR: CPT | Mod: 25 | Performed by: INTERNAL MEDICINE

## 2019-12-10 PROCEDURE — 84443 ASSAY THYROID STIM HORMONE: CPT | Performed by: INTERNAL MEDICINE

## 2019-12-10 PROCEDURE — 80053 COMPREHEN METABOLIC PANEL: CPT | Performed by: INTERNAL MEDICINE

## 2019-12-10 PROCEDURE — 85025 COMPLETE CBC W/AUTO DIFF WBC: CPT | Performed by: INTERNAL MEDICINE

## 2019-12-10 PROCEDURE — 36415 COLL VENOUS BLD VENIPUNCTURE: CPT | Performed by: INTERNAL MEDICINE

## 2019-12-10 PROCEDURE — 99213 OFFICE O/P EST LOW 20 MIN: CPT | Mod: 25 | Performed by: INTERNAL MEDICINE

## 2019-12-10 RX ORDER — PANTOPRAZOLE SODIUM 20 MG/1
TABLET, DELAYED RELEASE ORAL
Qty: 90 TABLET | Refills: 3 | Status: SHIPPED | OUTPATIENT
Start: 2019-12-10 | End: 2020-11-19

## 2019-12-10 ASSESSMENT — MIFFLIN-ST. JEOR: SCORE: 1126.59

## 2019-12-10 ASSESSMENT — ACTIVITIES OF DAILY LIVING (ADL): CURRENT_FUNCTION: NO ASSISTANCE NEEDED

## 2019-12-10 NOTE — PATIENT INSTRUCTIONS
I will let you know your lab results.     Have a good, safe winter with no falls!                           Do the stretching to help with your heel pain.               You are planning to lose 10 lbs.          Wear shoes indoors.           If there is no improvement, consider seeing a podiatrist.                      Call for an ENT appointment regarding your hearing loss.

## 2019-12-10 NOTE — PROGRESS NOTES
"SUBJECTIVE:   Caren Eid is a 79 year old female who presents for Preventive Visit.  Are you in the first 12 months of your Medicare coverage?  No    Healthy Habits:     In general, how would you rate your overall health?  Good    Frequency of exercise:  1 day/week    Duration of exercise:  45-60 minutes    Do you usually eat at least 4 servings of fruit and vegetables a day, include whole grains    & fiber and avoid regularly eating high fat or \"junk\" foods?  Yes    Taking medications regularly:  Yes    Medication side effects:  None    Ability to successfully perform activities of daily living:  No assistance needed    Home Safety:  No safety concerns identified    Hearing Impairment:  Need to ask people to speak up or repeat themselves    In the past 6 months, have you been bothered by leaking of urine?  No    In general, how would you rate your overall mental or emotional health?  Good      PHQ-2 Total Score: 0    Additional concerns today:  Yes    Do you feel safe in your environment? Yes    Have you ever done Advance Care Planning? (For example, a Health Directive, POLST, or a discussion with a medical provider or your loved ones about your wishes): Yes, patient states has an Advance Care Planning document and will bring a copy to the clinic.      Fall risk  Fallen 2 or more times in the past year?: No  Any fall with injury in the past year?: No    Cognitive Screening   1) Repeat 3 items (Leader, Season, Table)    2) Clock draw: NORMAL  3) 3 item recall: Recalls 3 objects  Results: 3 items recalled: COGNITIVE IMPAIRMENT LESS LIKELY    Mini-CogTM Copyright ZENIA Dawn. Licensed by the author for use in [facility]; reprinted with permission (vanessa@.[facility]). All rights reserved.      Do you have sleep apnea, excessive snoring or daytime drowsiness?: no    Reviewed and updated as needed this visit by clinical staff  Tobacco  Allergies  Meds  Med Hx  Surg Hx  Fam Hx  Soc Hx        Reviewed and " updated as needed this visit by Provider        Social History     Tobacco Use     Smoking status: Former Smoker     Last attempt to quit: 10/19/1989     Years since quittin.1     Smokeless tobacco: Never Used   Substance Use Topics     Alcohol use: Yes     Comment: 3 glass  wine wkly         Alcohol Use 12/10/2019   Prescreen: >3 drinks/day or >7 drinks/week? No   Prescreen: >3 drinks/day or >7 drinks/week? -                Reading a lot; lifting weights.     Still working.       Also has a pedometer.               Not fasting today.                            Bilateral heel pain.                                                 Stopped taking ASA 2 wks ago.  She wonders if this is necessary.     Current providers sharing in care for this patient include:   Patient Care Team:  Rajinder Baptiste MD as PCP - General (Internal Medicine)  Rajinder Baptiste MD as Assigned PCP    The following health maintenance items are reviewed in Epic and correct as of today:  Health Maintenance   Topic Date Due     PNEUMOCOCCAL IMMUNIZATION 65+ LOW/MEDIUM RISK (2 of 2 - PPSV23) 2016     FALL RISK ASSESSMENT  2017     ADVANCE CARE PLANNING  2019     MEDICARE ANNUAL WELLNESS VISIT  2019     DTAP/TDAP/TD IMMUNIZATION (3 - Td) 2022     LIPID  2023     DEXA  Completed     PHQ-2  Completed     INFLUENZA VACCINE  Completed     ZOSTER IMMUNIZATION  Completed     IPV IMMUNIZATION  Aged Out     MENINGITIS IMMUNIZATION  Aged Out     Patient Active Problem List    Diagnosis Date Noted     Memory loss 2019     Priority: High     Aortic valve stenosis, etiology of cardiac valve disease unspecified 2018     Priority: High     Mild on echocardiogram in .  Recheck in 1 or 2 years       Obesity (BMI 35.0-39.9) with comorbidity (H) 2019     Priority: Medium     Family history of malignant neoplasm of breast 2018     Priority: Medium     mother       Systolic ejection murmur 2018  "    Priority: Medium     Osteopenia of multiple sites 11/20/2018     Priority: Medium     Gastroesophageal reflux disease without esophagitis 08/23/2017     Priority: Medium     Hypothyroidism, unspecified type 07/13/2016     Priority: Medium     Abdominal pain, epigastric 02/18/2016     Priority: Medium     labs are normal; US \"borderline prominent pancreatic duct\"; CT suggests this is likely WNL;        EGD neg;  PPI for 3 months advised by GI       Pancreatic duct dilated 02/18/2016     Priority: Medium     CT neg except \"mildly prominent pancreatic duct\" \"likely WNL.           RTP.        Osteopenia 08/14/2015     Priority: Medium     In 8/15, +0.9 spine, -1.2 L femoral neck, -1.3 R                    In 9/17, T+1 spine, -1.9 L femoral neck, -1.2 R;    Start alendronate               In 10/19, -1.1 L fem neck, -1.4 R, +0.7 spine.       Having some GI sx; stop alendronate.        Personal history of tobacco use, presenting hazards to health 07/07/2015     Priority: Medium     Quit first in 1978; then smoked for 1 yr in 1988       Interstitial cystitis 07/07/2015     Priority: Medium     Dx by urology 2014; Dr Onofre       Plantar fasciitis 02/11/2013     Priority: Medium     Somatic dysfunction of thoracic region 02/11/2013     Priority: Medium     Somatic dysfunction of lumbar region 02/11/2013     Priority: Medium     Hyperlipidemia LDL goal <130 12/13/2012     Priority: Medium     Vitamin D deficiency 12/13/2012     Priority: Medium     22 in 3/15       Segmental and somatic dysfunction of rib cage 12/13/2012     Priority: Medium     Somatic dysfunction of lower extremities 12/13/2012     Priority: Medium     Somatic dysfunction of spine affecting head region 12/13/2012     Priority: Medium     Somatic dysfunction of cervical region 10/19/2012     Priority: Medium     Somatic dysfunction of sacral region 10/19/2012     Priority: Medium     Somatic dysfunction of pelvic region 10/19/2012     Priority: Medium "     Somatic dysfunction of spine, lumbar 10/19/2012     Priority: Medium     Somatic dysfunction of spine, thoracic 10/19/2012     Priority: Medium     Somatic dysfunction of upper extremities 10/19/2012     Priority: Medium     Somatic dysfunction of spine, cervical 10/19/2012     Priority: Medium     Somatic dysfunction of spine affecting pelvic region 10/19/2012     Priority: Medium     Back pain 10/19/2012     Priority: Medium     Cervicalgia 10/13/2012     Priority: Medium     Preventive measure 07/07/2015     Priority: Low     Pap 12/11                            Colonoscopy 11/07; 2/18, 2 tiny adenomas; recheck in 5 yrs advised  Mammogram 3/16, 4/17, 5/18, 6/19         ACP (advance care planning) 11/20/2014     Priority: Low     Advance Care Planning: Receipt of ACP document:  Received: Health Care Directive and Health Care Power of  which were witnessed or notarized on 11-24-11.  Document previously scanned on 1-18-12 in [facility] and 10-17-12 in epic.  Validation form completed and sent to be scanned.  Code Status needs to be updated to reflect choices in most recent ACP document.  Confirmed/documented designated decision maker(s). See permanent comments section of demographics in clinical tab. View document(s) and details by clicking on code status. Added by Monisha Peñaloza RN, System ACP Coordinator [employer] on 12/30/2014.             Past Surgical History:   Procedure Laterality Date     APPENDECTOMY  Age 12     COLONOSCOPY  11-20-07    Repeat Colonoscopy in 10 yrs.     D & C  1980     HC TOOTH EXTRACTION W/FORCEP  Age 22's - 2 of them     SURGERY GENERAL IP CONSULT  1983 benign tumor in neck       BP Readings from Last 3 Encounters:   12/10/19 122/76   09/17/19 130/70   07/01/19 145/83    Wt Readings from Last 3 Encounters:   12/10/19 74.4 kg (164 lb)   09/17/19 76.7 kg (169 lb)   05/28/19 71.9 kg (158 lb 8 oz)                  Current Outpatient Medications   Medication Sig Dispense Refill  "    aspirin 81 MG EC tablet Take 1 tablet (81 mg) by mouth daily 90 tablet 3     benzoyl peroxide (BENZOYL PEROXIDE WASH) 10 % external liquid Wash affected area once a day in the shower 226 g 3     Calcium Carbonate-Vit D-Min (CALCIUM 600 + MINERALS PO) Take  by mouth.       Cholecalciferol (VITAMIN D) 2000 UNITS CAPS Take 1 capful by mouth daily       clindamycin (CLEOCIN T) 1 % external lotion Apply to area affected BID 60 mL 3     levothyroxine (SYNTHROID/LEVOTHROID) 75 MCG tablet TAKE 1 TABLET BY MOUTH EVERY DAY 90 tablet 0     pantoprazole (PROTONIX) 20 MG EC tablet Take by mouth 30-60 minutes before a meal. 90 tablet 3     pravastatin (PRAVACHOL) 20 MG tablet TAKE 1 TABLET BY MOUTH EVERY DAY 90 tablet 0     Glucosamine-Chondroitin (GLUCOSAMINE CHONDR COMPLEX PO) Take  by mouth.       Omega-3 Fatty Acids (OMEGA 3 PO) Take  by mouth.           Review of Systems  CONSTITUTIONAL: NEGATIVE for fever, chills, change in weight  INTEGUMENTARY/SKIN: NEGATIVE for worrisome rashes, moles or lesions  EYES: NEGATIVE for vision changes or irritation  ENT/MOUTH: NEGATIVE for ear, mouth and throat problems  RESP: NEGATIVE for significant cough or SOB  BREAST: NEGATIVE for masses, tenderness or discharge  CV: NEGATIVE for chest pain, palpitations or peripheral edema  GI: NEGATIVE for nausea, abdominal pain, heartburn, or change in bowel habits  : NEGATIVE for frequency, dysuria, or hematuria  NEURO: NEGATIVE for weakness, dizziness or paresthesias  ENDOCRINE: NEGATIVE for temperature intolerance, skin/hair changes  HEME: NEGATIVE for bleeding problems  PSYCHIATRIC: NEGATIVE for changes in mood or affect    OBJECTIVE:   /76   Pulse 75   Temp 97.4  F (36.3  C) (Tympanic)   Resp 16   Ht 1.486 m (4' 10.5\")   Wt 74.4 kg (164 lb)   SpO2 100%   BMI 33.69 kg/m   Estimated body mass index is 33.69 kg/m  as calculated from the following:    Height as of this encounter: 1.486 m (4' 10.5\").    Weight as of this encounter: " 74.4 kg (164 lb).  Physical Exam  GENERAL APPEARANCE: alert, no distress and over weight  EYES: Eyes grossly normal to inspection  HENT: ear canals and TM's normal, nose and mouth without ulcers or lesions, oropharynx clear and oral mucous membranes moist  NECK: no adenopathy, no asymmetry, masses, or scars and thyroid normal to palpation  RESP: lungs clear to auscultation - no rales, rhonchi or wheezes  BREAST: normal without masses, tenderness or nipple discharge and no palpable axillary masses or adenopathy  CV: regular rate and rhythm, normal S1 S2, no S3 or S4, no murmur, click or rub, no peripheral edema and peripheral pulses strong  ABDOMEN: soft, nontender, no hepatosplenomegaly, no masses and bowel sounds normal  MS: Tender over the plantar aspect of both heels  SKIN: no suspicious lesions or rashes  NEURO: Normal strength and tone, mentation intact and speech normal  PSYCH: mentation appears normal and affect normal/bright    Diagnostic Test Results:  pending    ASSESSMENT / PLAN:   Caren was seen today for physical.    Diagnoses and all orders for this visit:    Routine history and physical examination of adult    Hypothyroidism, unspecified type  -     Comprehensive metabolic panel (BMP + Alb, Alk Phos, ALT, AST, Total. Bili, TP)  -     TSH with free T4 reflex    Memory loss    Aortic valve stenosis, etiology of cardiac valve disease unspecified    Hyperlipidemia LDL goal <130  -     Comprehensive metabolic panel (BMP + Alb, Alk Phos, ALT, AST, Total. Bili, TP)  -     Lipid Profile (Chol, Trig, HDL, LDL calc)    Gastroesophageal reflux disease without esophagitis  -     CBC with platelets and differential  -     pantoprazole (PROTONIX) 20 MG EC tablet; Take by mouth 30-60 minutes before a meal.    Plantar fasciitis    Bilateral hearing loss, unspecified hearing loss type  -     OTOLARYNGOLOGY REFERRAL    Summary and implications:  We reviewed multiple issues.           We reviewed all of the issues on  "the diagnoses list.           Check labs and adjust medications as indicated; 6 hr pp.                       We discussed treatment of plantar fasciitis.    We discussed that weight loss could be helpful.  Patient Instructions   I will let you know your lab results.     Have a good, safe winter with no falls!                           Do the stretching to help with your heel pain.               You are planning to lose 10 lbs.          Wear shoes indoors.           If there is no improvement, consider seeing a podiatrist.                      Call for an ENT appointment regarding your hearing loss.     Return in about 6 months (around 6/10/2020) for follow up of several issues.      COUNSELING:  Reviewed preventive health counseling, as reflected in patient instructions  Special attention given to:       Regular exercise       Healthy diet/nutrition    Estimated body mass index is 33.69 kg/m  as calculated from the following:    Height as of this encounter: 1.486 m (4' 10.5\").    Weight as of this encounter: 74.4 kg (164 lb).    Weight management plan: Discussed healthy diet and exercise guidelines     reports that she quit smoking about 30 years ago. She has never used smokeless tobacco.      Appropriate preventive services were discussed with this patient, including applicable screening as appropriate for cardiovascular disease, diabetes, osteopenia/osteoporosis, and glaucoma.  As appropriate for age/gender, discussed screening for colorectal cancer, prostate cancer, breast cancer, and cervical cancer. Checklist reviewing preventive services available has been given to the patient.    Reviewed patients plan of care and provided an AVS. The Basic Care Plan (routine screening as documented in Health Maintenance) for Caren meets the Care Plan requirement. This Care Plan has been established and reviewed with the Patient.    Counseling Resources:  ATP IV Guidelines  Pooled Cohorts Equation Calculator  Breast Cancer " Risk Calculator  FRAX Risk Assessment  ICSI Preventive Guidelines  Dietary Guidelines for Americans, 2010  USDA's MyPlate  ASA Prophylaxis  Lung CA Screening    Rajinder Baptiste MD  [facility]    Results for orders placed or performed in visit on 12/10/19   Comprehensive metabolic panel (BMP + Alb, Alk Phos, ALT, AST, Total. Bili, TP)     Status: Abnormal   Result Value Ref Range    Sodium 137 133 - 144 mmol/L    Potassium 3.7 3.4 - 5.3 mmol/L    Chloride 108 94 - 109 mmol/L    Carbon Dioxide 22 20 - 32 mmol/L    Anion Gap 7 3 - 14 mmol/L    Glucose 80 70 - 99 mg/dL    Urea Nitrogen 19 7 - 30 mg/dL    Creatinine 1.03 0.52 - 1.04 mg/dL    GFR Estimate 52 (L) >60 mL/min/[1.73_m2]    GFR Estimate If Black 61 >60 mL/min/[1.73_m2]    Calcium 9.1 8.5 - 10.1 mg/dL    Bilirubin Total 0.3 0.2 - 1.3 mg/dL    Albumin 3.5 3.4 - 5.0 g/dL    Protein Total 7.1 6.8 - 8.8 g/dL    Alkaline Phosphatase 95 40 - 150 U/L    ALT 37 0 - 50 U/L    AST 22 0 - 45 U/L   Lipid Profile (Chol, Trig, HDL, LDL calc)     Status: Abnormal   Result Value Ref Range    Cholesterol 262 (H) <200 mg/dL    Triglycerides 142 <150 mg/dL    HDL Cholesterol 67 >49 mg/dL    LDL Cholesterol Calculated 167 (H) <100 mg/dL    Non HDL Cholesterol 195 (H) <130 mg/dL   CBC with platelets and differential     Status: None   Result Value Ref Range    WBC 7.2 4.0 - 11.0 10e9/L    RBC Count 4.49 3.8 - 5.2 10e12/L    Hemoglobin 13.2 11.7 - 15.7 g/dL    Hematocrit 40.3 35.0 - 47.0 %    MCV 90 78 - 100 fl    MCH 29.4 26.5 - 33.0 pg    MCHC 32.8 31.5 - 36.5 g/dL    RDW 13.2 10.0 - 15.0 %    Platelet Count 335 150 - 450 10e9/L    % Neutrophils 60.0 %    % Lymphocytes 30.4 %    % Monocytes 7.5 %    % Eosinophils 1.8 %    % Basophils 0.3 %    Absolute Neutrophil 4.3 1.6 - 8.3 10e9/L    Absolute Lymphocytes 2.2 0.8 - 5.3 10e9/L    Absolute Monocytes 0.5 0.0 - 1.3 10e9/L    Absolute Eosinophils 0.1 0.0 - 0.7 10e9/L    Absolute Basophils 0.0 0.0 - 0.2  10e9/L    Diff Method Automated Method    TSH with free T4 reflex     Status: None   Result Value Ref Range    TSH 2.54 0.40 - 4.00 mU/L     My chart message sent.    Most of your lab results are good,including the kidney,liver,bone marrow,glucose,and the thyroid level.         The cholesterol is too high.           Please increase the pravastatin to 40 mg per day.     I have sent an Rx to your pharmacy for the new dosage.

## 2019-12-12 LAB
ALBUMIN SERPL-MCNC: 3.5 G/DL (ref 3.4–5)
ALP SERPL-CCNC: 95 U/L (ref 40–150)
ALT SERPL W P-5'-P-CCNC: 37 U/L (ref 0–50)
ANION GAP SERPL CALCULATED.3IONS-SCNC: 7 MMOL/L (ref 3–14)
AST SERPL W P-5'-P-CCNC: 22 U/L (ref 0–45)
BILIRUB SERPL-MCNC: 0.3 MG/DL (ref 0.2–1.3)
BUN SERPL-MCNC: 19 MG/DL (ref 7–30)
CALCIUM SERPL-MCNC: 9.1 MG/DL (ref 8.5–10.1)
CHLORIDE SERPL-SCNC: 108 MMOL/L (ref 94–109)
CHOLEST SERPL-MCNC: 262 MG/DL
CO2 SERPL-SCNC: 22 MMOL/L (ref 20–32)
CREAT SERPL-MCNC: 1.03 MG/DL (ref 0.52–1.04)
GFR SERPL CREATININE-BSD FRML MDRD: 52 ML/MIN/{1.73_M2}
GLUCOSE SERPL-MCNC: 80 MG/DL (ref 70–99)
HDLC SERPL-MCNC: 67 MG/DL
LDLC SERPL CALC-MCNC: 167 MG/DL
NONHDLC SERPL-MCNC: 195 MG/DL
POTASSIUM SERPL-SCNC: 3.7 MMOL/L (ref 3.4–5.3)
PROT SERPL-MCNC: 7.1 G/DL (ref 6.8–8.8)
SODIUM SERPL-SCNC: 137 MMOL/L (ref 133–144)
TRIGL SERPL-MCNC: 142 MG/DL
TSH SERPL DL<=0.005 MIU/L-ACNC: 2.54 MU/L (ref 0.4–4)

## 2019-12-12 RX ORDER — PRAVASTATIN SODIUM 40 MG
40 TABLET ORAL DAILY
Qty: 90 TABLET | Refills: 3 | Status: SHIPPED | OUTPATIENT
Start: 2019-12-12 | End: 2020-12-22

## 2019-12-13 RX ORDER — PRAVASTATIN SODIUM 20 MG
TABLET ORAL
Qty: 90 TABLET | Refills: 0 | OUTPATIENT
Start: 2019-12-13

## 2019-12-16 ENCOUNTER — TELEPHONE (OUTPATIENT)
Dept: FAMILY MEDICINE | Facility: CLINIC | Age: 79
End: 2019-12-16

## 2019-12-16 NOTE — TELEPHONE ENCOUNTER
Reason for Call:  Other call back    Detailed comments: The patient called and stated that Dr. Baptiste prescribed her pravastatin (PRAVACHOL) 40 MG tablet.  She has 20mg pills of pravastatin (PRAVACHOL) from a previous prescription, she would like to know if she can take 2 of those pills to use them up since she has already paid for them?  She would like a call back to discuss this.         Phone Number Patient can be reached at: Home number on file [phone] (home)    Best Time: Any    Can we leave a detailed message on this number? YES    Call taken on 12/16/2019 at 8:39 AM by Emerald López

## 2019-12-16 NOTE — TELEPHONE ENCOUNTER
Routing to provider:    Please see pt note below.     Currently, on 40 mg of pravastatin. 20 mg was dose of the prior medications. Wants to take 2 of the 20 mg until they are gone so she can save money. Is this OK?    Has pantoprazole, 40 mg from 9/8/19. And picked up pantoprazole 20 mg 12/12. Confused about which one she should be taking because she does not remember hearing about a dose change. Please clarify.

## 2019-12-16 NOTE — TELEPHONE ENCOUNTER
"Patient Contact    Attempt # 1    Was call answered?  Yes.  \"May I please speak with <patient name>\"  Is patient available?   Yes    "

## 2019-12-16 NOTE — TELEPHONE ENCOUNTER
Ok to take 2 of the 20 mg pravastatin.                      20 mg of pantoprazole should be adequate; she can finish up the 40 mg supply.     Please let her know.

## 2019-12-26 ENCOUNTER — TELEPHONE (OUTPATIENT)
Dept: FAMILY MEDICINE | Facility: CLINIC | Age: 79
End: 2019-12-26

## 2019-12-26 NOTE — TELEPHONE ENCOUNTER
Reason for Call:  Other call back    Detailed comments: The patient called and stated that Dr. Baptiste had recently upped the dosage of her pravastatin (PRAVACHOL) from 20 mgs to 40 mgs. She has some of the 20 mg tablets left and she is wondering if it would be ok for her to take two of the 20 mg tablets to equal 40 mgs until they are gone so she can get rid of them. She would like a call back to discuss if this would be ok or not.       Phone Number Patient can be reached at: Cell number on file:    Telephone Information:   Mobile [phone]       Best Time: Any    Can we leave a detailed message on this number? YES    Call taken on 12/26/2019 at 3:32 PM by Emerald Pena

## 2020-01-20 ENCOUNTER — TELEPHONE (OUTPATIENT)
Dept: FAMILY MEDICINE | Facility: CLINIC | Age: 80
End: 2020-01-20

## 2020-01-20 NOTE — TELEPHONE ENCOUNTER
password changed for pt.    Update given to last OV lab results.  Questions answered about medication.    No further action needed at this time.

## 2020-01-20 NOTE — TELEPHONE ENCOUNTER
Reason for Call:  Other appointment    Detailed comments:   Caren called regarding pravastatin. She says in June of 2019 she was taking 20mg, then in Dec of 2019 she was prescribed 40gm.      1) She asks why the dosage change.    2)She asks can she take 2 of the 20mg tablets to equal the 40mg dosage, to use up her 20mg tablets.     Phone Number Patient can be reached at: Cell number on file:    Telephone Information:   Mobile [phone]       Best Time: any    Can we leave a detailed message on this number? YES    Call taken on 1/20/2020 at 10:59 AM by Lu Diaz

## 2020-02-22 ENCOUNTER — OFFICE VISIT (OUTPATIENT)
Dept: FAMILY MEDICINE | Facility: CLINIC | Age: 80
End: 2020-02-22
Payer: COMMERCIAL

## 2020-02-22 VITALS
BODY MASS INDEX: 33.47 KG/M2 | TEMPERATURE: 97.5 F | WEIGHT: 166 LBS | HEART RATE: 82 BPM | DIASTOLIC BLOOD PRESSURE: 70 MMHG | RESPIRATION RATE: 12 BRPM | HEIGHT: 59 IN | SYSTOLIC BLOOD PRESSURE: 130 MMHG

## 2020-02-22 DIAGNOSIS — R10.13 EPIGASTRIC PAIN: Primary | ICD-10-CM

## 2020-02-22 DIAGNOSIS — H91.93 BILATERAL HEARING LOSS, UNSPECIFIED HEARING LOSS TYPE: ICD-10-CM

## 2020-02-22 PROCEDURE — 99213 OFFICE O/P EST LOW 20 MIN: CPT | Performed by: INTERNAL MEDICINE

## 2020-02-22 RX ORDER — ALENDRONATE SODIUM 70 MG/1
1 TABLET ORAL
Refills: 3 | COMMUNITY
Start: 2019-07-03 | End: 2020-08-31

## 2020-02-22 ASSESSMENT — MIFFLIN-ST. JEOR: SCORE: 1135.66

## 2020-02-22 NOTE — PROGRESS NOTES
Subjective     Caren Eid is a 79 year old female who presents to clinic today for the following health issues:    HPI   Abdominal Pain      Duration: on and off past 2-3 months    Description (location/character/radiation): epigastric       Associated flank pain: None    Intensity:  mild    Accompanying signs and symptoms:        Fever/Chills: no        Gas/Bloating: no        Nausea/vomitting: no        Diarrhea: no        Dysuria or Hematuria: no     History (previous similar pain/trauma/previous testing):     Precipitating or alleviating factors:       Pain worse with eating/BM/urination: yes- spicy and tomatoes        Pain relieved by BM: no     Therapies tried and outcome: less acidic foods, pantoprazole    LMP:  not applicable    Ear Problem      Duration: ongoing    Description (location/character/radiation): has noticed decreased hearing    Intensity:  mild    Accompanying signs and symptoms: has told by audiologist she needs her ears cleaned    History (similar episodes/previous evaluation): None    Precipitating or alleviating factors: None    Therapies tried and outcome: None     She has had continual mild epigastric soreness for approximately 4 years now.        An EGD nearly 4 years ago was negative.  She also had an abdominal CT.          She takes Protonix daily.  She states this discomfort does not interfere with her life in any appreciable way.  She still has a good appetite, no dysphasia, no nausea or vomiting, no melena or hematochezia.  There is no exertional component.                                           She was also told to get her ear canals checked before she goes for an audiology evaluation.  She has hearing loss and she thinks she may need hearing aids.    Current Outpatient Medications   Medication Sig Dispense Refill     alendronate (FOSAMAX) 70 MG tablet Take 1 tablet by mouth every 7 days  3     aspirin (ASPIRIN) 81 MG EC tablet Take 81 mg by mouth daily       benzoyl  "peroxide (BENZOYL PEROXIDE WASH) 10 % external liquid Wash affected area once a day in the shower 226 g 3     Calcium Carbonate-Vit D-Min (CALCIUM 600 + MINERALS PO) Take  by mouth.       Cholecalciferol (VITAMIN D) 2000 UNITS CAPS Take 1 capful by mouth daily       clindamycin (CLEOCIN T) 1 % external lotion Apply to area affected BID 60 mL 3     Glucosamine-Chondroitin (GLUCOSAMINE CHONDR COMPLEX PO) Take  by mouth.       levothyroxine (SYNTHROID/LEVOTHROID) 75 MCG tablet TAKE 1 TABLET BY MOUTH EVERY DAY 90 tablet 0     Omega-3 Fatty Acids (OMEGA 3 PO) Take  by mouth.       pantoprazole (PROTONIX) 20 MG EC tablet Take by mouth 30-60 minutes before a meal. 90 tablet 3     pravastatin (PRAVACHOL) 40 MG tablet Take 1 tablet (40 mg) by mouth daily 90 tablet 3     No Known Allergies  BP Readings from Last 3 Encounters:   02/22/20 130/70   12/10/19 122/76   09/17/19 130/70    Wt Readings from Last 3 Encounters:   02/22/20 75.3 kg (166 lb)   12/10/19 74.4 kg (164 lb)   09/17/19 76.7 kg (169 lb)               Patient Active Problem List    Diagnosis Date Noted     Memory loss 09/17/2019     Priority: High     Aortic valve stenosis, etiology of cardiac valve disease unspecified 12/17/2018     Priority: High     Mild on echocardiogram in 12/18.  Recheck in 1 or 2 years       Obesity (BMI 35.0-39.9) with comorbidity (H) 09/17/2019     Priority: Medium     Family history of malignant neoplasm of breast 11/20/2018     Priority: Medium     mother       Systolic ejection murmur 11/20/2018     Priority: Medium     Osteopenia of multiple sites 11/20/2018     Priority: Medium     Gastroesophageal reflux disease without esophagitis 08/23/2017     Priority: Medium     Hypothyroidism, unspecified type 07/13/2016     Priority: Medium     Abdominal pain, epigastric 02/18/2016     Priority: Medium     labs are normal; US \"borderline prominent pancreatic duct\"; CT suggests this is likely WNL;        EGD neg;  PPI for 3 months advised by " "GI       Pancreatic duct dilated 02/18/2016     Priority: Medium     CT neg except \"mildly prominent pancreatic duct\" \"likely WNL.           RTP.        Osteopenia 08/14/2015     Priority: Medium     In 8/15, +0.9 spine, -1.2 L femoral neck, -1.3 R                    In 9/17, T+1 spine, -1.9 L femoral neck, -1.2 R;    Start alendronate               In 10/19, -1.1 L fem neck, -1.4 R, +0.7 spine.       Having some GI sx; stop alendronate.        Personal history of tobacco use, presenting hazards to health 07/07/2015     Priority: Medium     Quit first in 1978; then smoked for 1 yr in 1988       Interstitial cystitis 07/07/2015     Priority: Medium     Dx by urology 2014; Dr Onofre       Plantar fasciitis 02/11/2013     Priority: Medium     Somatic dysfunction of thoracic region 02/11/2013     Priority: Medium     Somatic dysfunction of lumbar region 02/11/2013     Priority: Medium     Hyperlipidemia LDL goal <130 12/13/2012     Priority: Medium     Vitamin D deficiency 12/13/2012     Priority: Medium     22 in 3/15       Segmental and somatic dysfunction of rib cage 12/13/2012     Priority: Medium     Somatic dysfunction of lower extremities 12/13/2012     Priority: Medium     Somatic dysfunction of spine affecting head region 12/13/2012     Priority: Medium     Somatic dysfunction of cervical region 10/19/2012     Priority: Medium     Somatic dysfunction of sacral region 10/19/2012     Priority: Medium     Somatic dysfunction of pelvic region 10/19/2012     Priority: Medium     Somatic dysfunction of spine, lumbar 10/19/2012     Priority: Medium     Somatic dysfunction of spine, thoracic 10/19/2012     Priority: Medium     Somatic dysfunction of upper extremities 10/19/2012     Priority: Medium     Somatic dysfunction of spine, cervical 10/19/2012     Priority: Medium     Somatic dysfunction of spine affecting pelvic region 10/19/2012     Priority: Medium     Back pain 10/19/2012     Priority: Medium     " "Cervicalgia 10/13/2012     Priority: Medium     Preventive measure 07/07/2015     Priority: Low     Pap 12/11                            Colonoscopy 11/07; 2/18, 2 tiny adenomas; recheck in 5 yrs advised  Mammogram 3/16, 4/17, 5/18, 6/19         ACP (advance care planning) 11/20/2014     Priority: Low     Advance Care Planning: Receipt of ACP document:  Received: Health Care Directive and Health Care Power of  which were witnessed or notarized on 11-24-11.  Document previously scanned on 1-18-12 in [facility] and 10-17-12 in epic.  Validation form completed and sent to be scanned.  Code Status needs to be updated to reflect choices in most recent ACP document.  Confirmed/documented designated decision maker(s). See permanent comments section of demographics in clinical tab. View document(s) and details by clicking on code status. Added by Monisha Peñaloza RN, System ACP Coordinator [employer] on 12/30/2014.             Past Surgical History:   Procedure Laterality Date     APPENDECTOMY  Age 12     COLONOSCOPY  11-20-07    Repeat Colonoscopy in 10 yrs.     D & C  1980     HC TOOTH EXTRACTION W/FORCEP  Age 22's - 2 of them     SURGERY GENERAL IP CONSULT  1983 benign tumor in neck            Reviewed and updated as needed this visit by Provider         Review of Systems see above plus  ROS COMP: ENT/MOUTH: NEGATIVE for ear pain bilateral  GI: NEGATIVE for Hx stomach or duodenal ulcer      Objective    /70   Pulse 82   Temp 97.5  F (36.4  C) (Tympanic)   Resp 12   Ht 1.486 m (4' 10.5\")   Wt 75.3 kg (166 lb)   BMI 34.10 kg/m    Body mass index is 34.1 kg/m .  Physical Exam   GENERAL APPEARANCE: alert, no distress and over weight  HENT: ear canals and TM's normal  CV: regular rates and rhythm, normal S1 S2, no S3 or S4 and murmur of AS  ABDOMEN: soft, nontender, without hepatosplenomegaly or masses and there is some low-grade tenderness to palpation along the costal margins anteriorly    Diagnostic Test " Results:  none         Assessment & Plan     Caren was seen today for ear problem and gastrointestinal problem.    Diagnoses and all orders for this visit:    Epigastric pain    Bilateral hearing loss, unspecified hearing loss type                   She will have audiology evaluation.            Regarding her constant epigastric soreness, I do not have a good explanation.  However, this has been evaluated, is low-grade and chronic.  It does not appear that further work-up would be useful.  Patient Instructions   Plan a mammogram after June 18.           There is no ear wax on either side.         Return in about 6 months (around 8/22/2020) for follow up of several issues.    Rajinder Baptiste MD  [facility]

## 2020-03-04 DIAGNOSIS — E03.9 HYPOTHYROIDISM, UNSPECIFIED TYPE: ICD-10-CM

## 2020-03-04 RX ORDER — LEVOTHYROXINE SODIUM 75 UG/1
TABLET ORAL
Qty: 90 TABLET | Refills: 2 | Status: SHIPPED | OUTPATIENT
Start: 2020-03-04 | End: 2020-11-18

## 2020-03-04 NOTE — TELEPHONE ENCOUNTER
"Requested Prescriptions   Pending Prescriptions Disp Refills     levothyroxine (SYNTHROID/LEVOTHROID) 75 MCG tablet [Pharmacy Med Name: LEVOTHYROXINE 75 MCG TABLET] 90 tablet 0     Sig: TAKE 1 TABLET BY MOUTH EVERY DAY       Last Written Prescription Date:  11/11/2019  Last Fill Quantity: 90,  # refills: 0   Last office visit: 2/22/2020 with prescribing provider:  2/22/2020   Future Office Visit:          Thyroid Protocol Passed - 3/4/2020  8:09 AM        Passed - Patient is 12 years or older        Passed - Recent (12 mo) or future (30 days) visit within the authorizing provider's specialty     Patient has had an office visit with the authorizing provider or a provider within the authorizing providers department within the previous 12 mos or has a future within next 30 days. See \"Patient Info\" tab in inbasket, or \"Choose Columns\" in Meds & Orders section of the refill encounter.              Passed - Medication is active on med list        Passed - Normal TSH on file in past 12 months     Recent Labs   Lab Test 12/10/19  1510   TSH 2.54              Passed - No active pregnancy on record     If patient is pregnant or has had a positive pregnancy test, please check TSH.          Passed - No positive pregnancy test in past 12 months     If patient is pregnant or has had a positive pregnancy test, please check TSH.            "

## 2020-03-04 NOTE — TELEPHONE ENCOUNTER
Prescription approved per [facility] Refill Protocol for 12 months of refills since last appointment, which was 2/22/2020

## 2020-08-11 ENCOUNTER — ANCILLARY PROCEDURE (OUTPATIENT)
Dept: MAMMOGRAPHY | Facility: CLINIC | Age: 80
End: 2020-08-11
Payer: COMMERCIAL

## 2020-08-11 DIAGNOSIS — Z12.31 VISIT FOR SCREENING MAMMOGRAM: ICD-10-CM

## 2020-08-11 PROCEDURE — 77067 SCR MAMMO BI INCL CAD: CPT | Mod: TC

## 2020-08-11 PROCEDURE — 77063 BREAST TOMOSYNTHESIS BI: CPT

## 2020-08-30 PROBLEM — R41.3 MEMORY LOSS: Chronic | Status: ACTIVE | Noted: 2019-09-17

## 2020-08-31 ENCOUNTER — OFFICE VISIT (OUTPATIENT)
Dept: FAMILY MEDICINE | Facility: CLINIC | Age: 80
End: 2020-08-31
Payer: COMMERCIAL

## 2020-08-31 VITALS
RESPIRATION RATE: 16 BRPM | BODY MASS INDEX: 33.08 KG/M2 | SYSTOLIC BLOOD PRESSURE: 126 MMHG | DIASTOLIC BLOOD PRESSURE: 78 MMHG | OXYGEN SATURATION: 97 % | TEMPERATURE: 98.8 F | WEIGHT: 161 LBS | HEART RATE: 83 BPM

## 2020-08-31 DIAGNOSIS — M85.89 OSTEOPENIA OF MULTIPLE SITES: ICD-10-CM

## 2020-08-31 DIAGNOSIS — E03.9 HYPOTHYROIDISM, UNSPECIFIED TYPE: ICD-10-CM

## 2020-08-31 DIAGNOSIS — E78.5 HYPERLIPIDEMIA LDL GOAL <130: Primary | Chronic | ICD-10-CM

## 2020-08-31 DIAGNOSIS — H91.93 BILATERAL HEARING LOSS, UNSPECIFIED HEARING LOSS TYPE: ICD-10-CM

## 2020-08-31 DIAGNOSIS — Z23 NEED FOR PROPHYLACTIC VACCINATION AND INOCULATION AGAINST INFLUENZA: ICD-10-CM

## 2020-08-31 DIAGNOSIS — Z80.3 FAMILY HISTORY OF MALIGNANT NEOPLASM OF BREAST: ICD-10-CM

## 2020-08-31 DIAGNOSIS — R41.3 MEMORY LOSS: ICD-10-CM

## 2020-08-31 DIAGNOSIS — I35.0 AORTIC VALVE STENOSIS, ETIOLOGY OF CARDIAC VALVE DISEASE UNSPECIFIED: ICD-10-CM

## 2020-08-31 DIAGNOSIS — R10.13 ABDOMINAL PAIN, EPIGASTRIC: ICD-10-CM

## 2020-08-31 LAB
BASOPHILS # BLD AUTO: 0 10E9/L (ref 0–0.2)
BASOPHILS NFR BLD AUTO: 0.4 %
DIFFERENTIAL METHOD BLD: NORMAL
EOSINOPHIL # BLD AUTO: 0.1 10E9/L (ref 0–0.7)
EOSINOPHIL NFR BLD AUTO: 1.1 %
ERYTHROCYTE [DISTWIDTH] IN BLOOD BY AUTOMATED COUNT: 13.3 % (ref 10–15)
HCT VFR BLD AUTO: 42.4 % (ref 35–47)
HGB BLD-MCNC: 14.2 G/DL (ref 11.7–15.7)
LYMPHOCYTES # BLD AUTO: 2.3 10E9/L (ref 0.8–5.3)
LYMPHOCYTES NFR BLD AUTO: 26.9 %
MCH RBC QN AUTO: 29.6 PG (ref 26.5–33)
MCHC RBC AUTO-ENTMCNC: 33.5 G/DL (ref 31.5–36.5)
MCV RBC AUTO: 89 FL (ref 78–100)
MONOCYTES # BLD AUTO: 0.7 10E9/L (ref 0–1.3)
MONOCYTES NFR BLD AUTO: 7.7 %
NEUTROPHILS # BLD AUTO: 5.4 10E9/L (ref 1.6–8.3)
NEUTROPHILS NFR BLD AUTO: 63.9 %
PLATELET # BLD AUTO: 361 10E9/L (ref 150–450)
RBC # BLD AUTO: 4.79 10E12/L (ref 3.8–5.2)
WBC # BLD AUTO: 8.5 10E9/L (ref 4–11)

## 2020-08-31 PROCEDURE — 80053 COMPREHEN METABOLIC PANEL: CPT | Performed by: INTERNAL MEDICINE

## 2020-08-31 PROCEDURE — 85025 COMPLETE CBC W/AUTO DIFF WBC: CPT | Performed by: INTERNAL MEDICINE

## 2020-08-31 PROCEDURE — 90662 IIV NO PRSV INCREASED AG IM: CPT | Performed by: INTERNAL MEDICINE

## 2020-08-31 PROCEDURE — 83721 ASSAY OF BLOOD LIPOPROTEIN: CPT | Performed by: INTERNAL MEDICINE

## 2020-08-31 PROCEDURE — G0008 ADMIN INFLUENZA VIRUS VAC: HCPCS | Performed by: INTERNAL MEDICINE

## 2020-08-31 PROCEDURE — 84443 ASSAY THYROID STIM HORMONE: CPT | Performed by: INTERNAL MEDICINE

## 2020-08-31 PROCEDURE — 36415 COLL VENOUS BLD VENIPUNCTURE: CPT | Performed by: INTERNAL MEDICINE

## 2020-08-31 PROCEDURE — 99214 OFFICE O/P EST MOD 30 MIN: CPT | Performed by: INTERNAL MEDICINE

## 2020-08-31 NOTE — PATIENT INSTRUCTIONS
I will let you know your lab results.                             You are planning to have another mammogram next August.                            Call [phone] to set up the echocardiogram.

## 2020-08-31 NOTE — PROGRESS NOTES
"Subjective     Caren Eid is a 79 year old female who presents to clinic today for the following health issues:    HPI       Hyperlipidemia Follow-Up      Are you regularly taking any medication or supplement to lower your cholesterol?   Yes- pravastatin    Are you having muscle aches or other side effects that you think could be caused by your cholesterol lowering medication?  No    Hypothyroidism Follow-up      Since last visit, patient describes the following symptoms: Weight stable, no hair loss, no skin changes, no constipation, no loose stools      How many servings of fruits and vegetables do you eat daily?  4 or more    On average, how many sweetened beverages do you drink each day (Examples: soda, juice, sweet tea, etc.  Do NOT count diet or artificially sweetened beverages)?   1    How many days per week do you exercise enough to make your heart beat faster? 7    How many minutes a day do you exercise enough to make your heart beat faster? 30 - 60    How many days per week do you miss taking your medication? 0                     Planning on hearing aids.                        Wonders about continuing to have mammograms.                          Not currently working.                   \"I am adjusted to being alone\".               Minimal epigastric soreness. Not taking alendronate; though still on her med list. Removed by me.                 Due for another echo.               Has noted some memory issues.                          Review of Systems   CONSTITUTIONAL:NEGATIVE  for chills and fever   RESP:NEGATIVE for significant cough or SOB  CV: NEGATIVE for chest pain, palpitations or peripheral edema      Wt Readings from Last 4 Encounters:   08/31/20 73 kg (161 lb)   02/22/20 75.3 kg (166 lb)   12/10/19 74.4 kg (164 lb)   09/17/19 76.7 kg (169 lb)       Objective    /78   Pulse 83   Temp 98.8  F (37.1  C)   Resp 16   Wt 73 kg (161 lb)   SpO2 97%   BMI 33.08 kg/m    Body mass index is " "33.08 kg/m .  Physical Exam   GENERAL APPEARANCE: alert and no distress  RESP: lungs clear to auscultation - no rales, rhonchi or wheezes  CV: regular rates and rhythm, normal S1 S2, no S3 or S4 and soft systolic murmur  PSYCH: verbose; perseverates a bit  MENTAL STATUS EXAM:  Appearance/Behavior: No apparent distress  Speech: Normal              Assessment & Plan     Caren was seen today for recheck medication.    Diagnoses and all orders for this visit:    Hyperlipidemia LDL goal <130  -     LDL cholesterol direct    Hypothyroidism, unspecified type  -     TSH with free T4 reflex    Aortic valve stenosis, etiology of cardiac valve disease unspecified  -     Echocardiogram Complete; Future    Family history of malignant neoplasm of breast  Comments:  mother    Abdominal pain, epigastric  -     Comprehensive metabolic panel (BMP + Alb, Alk Phos, ALT, AST, Total. Bili, TP)  -     CBC with platelets and differential    Bilateral hearing loss, unspecified hearing loss type    Osteopenia of multiple sites    Memory loss         BMI:   Estimated body mass index is 34.1 kg/m  as calculated from the following:    Height as of 2/22/20: 1.486 m (4' 10.5\").    Weight as of 2/22/20: 75.3 kg (166 lb).   Weight management plan: Discussed healthy diet and exercise guidelines        Summary and implications:  We reviewed multiple issues.           We reviewed all of the issues on the diagnoses list.         Check labs and adjust medications as indicated.  Labs are 6 hrs pp.        Echo needed.       No sx.                             She has noticed mild memory loss.       Will need to monitor this going forward.          Patient Instructions   I will let you know your lab results.                             You are planning to have another mammogram next August.                            Call [phone] to set up the echocardiogram.                          Return in about 6 months (around 2/28/2021) for follow up of several " issues.    Rajinder Baptiste MD  [facility]     Results for orders placed or performed in visit on 08/31/20   Comprehensive metabolic panel (BMP + Alb, Alk Phos, ALT, AST, Total. Bili, TP)     Status: Abnormal   Result Value Ref Range    Sodium 138 133 - 144 mmol/L    Potassium 4.7 3.4 - 5.3 mmol/L    Chloride 109 94 - 109 mmol/L    Carbon Dioxide 23 20 - 32 mmol/L    Anion Gap 6 3 - 14 mmol/L    Glucose 99 70 - 99 mg/dL    Urea Nitrogen 15 7 - 30 mg/dL    Creatinine 0.98 0.52 - 1.04 mg/dL    GFR Estimate 56 (L) >60 mL/min/[1.73_m2]    GFR Estimate If Black 65 >60 mL/min/[1.73_m2]    Calcium 9.9 8.5 - 10.1 mg/dL    Bilirubin Total 0.4 0.2 - 1.3 mg/dL    Albumin 4.0 3.4 - 5.0 g/dL    Protein Total 7.6 6.8 - 8.8 g/dL    Alkaline Phosphatase 94 40 - 150 U/L    ALT 34 0 - 50 U/L    AST 23 0 - 45 U/L   LDL cholesterol direct     Status: Abnormal   Result Value Ref Range    LDL Cholesterol Direct 145 (H) <100 mg/dL   CBC with platelets and differential     Status: None   Result Value Ref Range    WBC 8.5 4.0 - 11.0 10e9/L    RBC Count 4.79 3.8 - 5.2 10e12/L    Hemoglobin 14.2 11.7 - 15.7 g/dL    Hematocrit 42.4 35.0 - 47.0 %    MCV 89 78 - 100 fl    MCH 29.6 26.5 - 33.0 pg    MCHC 33.5 31.5 - 36.5 g/dL    RDW 13.3 10.0 - 15.0 %    Platelet Count 361 150 - 450 10e9/L    % Neutrophils 63.9 %    % Lymphocytes 26.9 %    % Monocytes 7.7 %    % Eosinophils 1.1 %    % Basophils 0.4 %    Absolute Neutrophil 5.4 1.6 - 8.3 10e9/L    Absolute Lymphocytes 2.3 0.8 - 5.3 10e9/L    Absolute Monocytes 0.7 0.0 - 1.3 10e9/L    Absolute Eosinophils 0.1 0.0 - 0.7 10e9/L    Absolute Basophils 0.0 0.0 - 0.2 10e9/L    Diff Method Automated Method    TSH with free T4 reflex     Status: None   Result Value Ref Range    TSH 0.41 0.40 - 4.00 mU/L     My chart message sent.                                              Your cholesterol is slightly better.        Your other lab results are normal or stable,including the  liver,kidney, thyroid, and the bone marrow testing.        Keep taking the same medications.

## 2020-09-01 LAB
ALBUMIN SERPL-MCNC: 4 G/DL (ref 3.4–5)
ALP SERPL-CCNC: 94 U/L (ref 40–150)
ALT SERPL W P-5'-P-CCNC: 34 U/L (ref 0–50)
ANION GAP SERPL CALCULATED.3IONS-SCNC: 6 MMOL/L (ref 3–14)
AST SERPL W P-5'-P-CCNC: 23 U/L (ref 0–45)
BILIRUB SERPL-MCNC: 0.4 MG/DL (ref 0.2–1.3)
BUN SERPL-MCNC: 15 MG/DL (ref 7–30)
CALCIUM SERPL-MCNC: 9.9 MG/DL (ref 8.5–10.1)
CHLORIDE SERPL-SCNC: 109 MMOL/L (ref 94–109)
CO2 SERPL-SCNC: 23 MMOL/L (ref 20–32)
CREAT SERPL-MCNC: 0.98 MG/DL (ref 0.52–1.04)
GFR SERPL CREATININE-BSD FRML MDRD: 56 ML/MIN/{1.73_M2}
GLUCOSE SERPL-MCNC: 99 MG/DL (ref 70–99)
LDLC SERPL DIRECT ASSAY-MCNC: 145 MG/DL
POTASSIUM SERPL-SCNC: 4.7 MMOL/L (ref 3.4–5.3)
PROT SERPL-MCNC: 7.6 G/DL (ref 6.8–8.8)
SODIUM SERPL-SCNC: 138 MMOL/L (ref 133–144)
TSH SERPL DL<=0.005 MIU/L-ACNC: 0.41 MU/L (ref 0.4–4)

## 2020-09-02 ENCOUNTER — TELEPHONE (OUTPATIENT)
Dept: FAMILY MEDICINE | Facility: CLINIC | Age: 80
End: 2020-09-02

## 2020-09-02 NOTE — TELEPHONE ENCOUNTER
Reason for Call:  Other call back    Detailed comments: Pt states Dr Oliva had her stop taking alendronate before she had labs done.  She is wondering if she should start taking that med again.    Phone Number Patient can be reached at: Home number on file [phone] (home)    Best Time: anytime    Can we leave a detailed message on this number? YES    Call taken on 9/2/2020 at 11:25 AM by KIRA PEÑA

## 2020-09-09 ENCOUNTER — TELEPHONE (OUTPATIENT)
Dept: FAMILY MEDICINE | Facility: CLINIC | Age: 80
End: 2020-09-09

## 2020-09-09 ENCOUNTER — TELEPHONE (OUTPATIENT)
Dept: CARDIOLOGY | Facility: CLINIC | Age: 80
End: 2020-09-09

## 2020-09-09 NOTE — TELEPHONE ENCOUNTER
Reason for Call:  Other Pt request    Detailed comments: Pt would like a copy of her lab results from 8/31/20 mailed to her home.    Phone Number Patient can be reached at: Home number on file [phone] (home)    Best Time: anytime    Can we leave a detailed message on this number? YES    Call taken on 9/9/2020 at 10:53 AM by KIRA PEÑA

## 2020-09-09 NOTE — LETTER
September 9, 2020      Caren Eid  [address]   [place] [address]        Dear ,    We are writing to inform you of your test results.    Your cholesterol is slightly better.        Your other lab results are normal or stable,including the liver,kidney, thyroid, and the bone marrow testing.        Keep taking the same medications.    If you have any questions or concerns, please call the clinic at the number listed above.       Sincerely,        Rajinder Baptiste MD

## 2020-09-09 NOTE — TELEPHONE ENCOUNTER
PATIENT WELLNESS TELEPHONE SCREENING     Step 1 Screening Questions    In the past 3 weeks, have you been exposed to someone with a suspected or known illness?  COVID-19? No  Chickenpox? No   Measles? No  Pertussis? No    Do you have one of the following NEW symptoms?   Fever/Chills? No   Cough? No   Shortness of breath? No   New loss of taste or smell? No  Sore throat? No  Muscle or body aches? No  Headaches? No  Fatigue? No  Vomiting or diarrhea? No    Have you had a positive COVID-19 diagnostic test (nasal swab test) in the last 14 days or are you currently on self-quarantine restrictions (i.e. travel restrictions, exposures, etc.)?No    Step 2 Screening Results (Skip if the patient is negative for symptoms), If Yes:    Due to your current symptoms, for our safety we need to cancel your appointment. We are advising that you consult with your ordering provider or [facility] at [phone].    Step 3 Review Visitor Policy  Patient informed of the updated visitor policy   1 visitor allowed per patient   Visitor must screen negative for COVID symptoms   Visitor must wear a mask

## 2020-09-10 ENCOUNTER — TELEPHONE (OUTPATIENT)
Dept: FAMILY MEDICINE | Facility: CLINIC | Age: 80
End: 2020-09-10

## 2020-09-10 ENCOUNTER — HOSPITAL ENCOUNTER (OUTPATIENT)
Dept: CARDIOLOGY | Facility: CLINIC | Age: 80
Discharge: HOME | End: 2020-09-10
Attending: INTERNAL MEDICINE | Admitting: INTERNAL MEDICINE
Payer: COMMERCIAL

## 2020-09-10 DIAGNOSIS — I35.0 AORTIC VALVE STENOSIS, ETIOLOGY OF CARDIAC VALVE DISEASE UNSPECIFIED: Primary | Chronic | ICD-10-CM

## 2020-09-10 DIAGNOSIS — I35.0 AORTIC VALVE STENOSIS, ETIOLOGY OF CARDIAC VALVE DISEASE UNSPECIFIED: ICD-10-CM

## 2020-09-10 PROCEDURE — 93306 TTE W/DOPPLER COMPLETE: CPT

## 2020-09-10 PROCEDURE — 93306 TTE W/DOPPLER COMPLETE: CPT | Mod: 26 | Performed by: INTERNAL MEDICINE

## 2020-09-10 NOTE — TELEPHONE ENCOUNTER
Reason for Call:  Request for results:    Name of test or procedure:   multiple    Date of test of procedure: 09/10/2020  Location of the test or procedure:  [facility]    OK to leave the result message on voice mail or with a family member? YES    Phone number Patient can be reached at:  Home number on file [phone] (home)    Additional comments:    Patient wants test results mailed to her    She is locked out of Mychart results     She would like future results mailed to her as well     Call taken on 9/10/2020 at 1:42 PM by ELEANOR BAUTISTA

## 2020-09-23 ENCOUNTER — OFFICE VISIT (OUTPATIENT)
Dept: FAMILY MEDICINE | Facility: CLINIC | Age: 80
End: 2020-09-23
Payer: COMMERCIAL

## 2020-09-23 ENCOUNTER — ANCILLARY PROCEDURE (OUTPATIENT)
Dept: GENERAL RADIOLOGY | Facility: CLINIC | Age: 80
End: 2020-09-23
Attending: INTERNAL MEDICINE
Payer: COMMERCIAL

## 2020-09-23 VITALS
SYSTOLIC BLOOD PRESSURE: 130 MMHG | OXYGEN SATURATION: 99 % | DIASTOLIC BLOOD PRESSURE: 72 MMHG | HEART RATE: 83 BPM | RESPIRATION RATE: 16 BRPM

## 2020-09-23 DIAGNOSIS — I35.0 AORTIC VALVE STENOSIS, ETIOLOGY OF CARDIAC VALVE DISEASE UNSPECIFIED: Chronic | ICD-10-CM

## 2020-09-23 DIAGNOSIS — R07.9 CHEST PAIN, UNSPECIFIED TYPE: Primary | ICD-10-CM

## 2020-09-23 DIAGNOSIS — K21.9 GASTROESOPHAGEAL REFLUX DISEASE WITHOUT ESOPHAGITIS: ICD-10-CM

## 2020-09-23 DIAGNOSIS — R07.9 CHEST PAIN, UNSPECIFIED TYPE: ICD-10-CM

## 2020-09-23 DIAGNOSIS — R10.13 EPIGASTRIC PAIN: ICD-10-CM

## 2020-09-23 PROCEDURE — 99213 OFFICE O/P EST LOW 20 MIN: CPT | Performed by: INTERNAL MEDICINE

## 2020-09-23 PROCEDURE — 71046 X-RAY EXAM CHEST 2 VIEWS: CPT | Mod: FY

## 2020-09-23 NOTE — PATIENT INSTRUCTIONS
Let's do this:                Stop drinking coffee; this may be upsetting your stomach.             You can substitute black tea, with skim milk.                   Your chest xray looks good.               We will see what the cardiologist says about your aortic valve.

## 2020-09-23 NOTE — PROGRESS NOTES
Subjective     Caren Eid is a 79 year old female who presents to clinic today for the following health issues:    HPI       CHEST PAIN  Onset/Duration: a couple months  Description:   Location: in center  Character: sharp  Radiation: none  Duration: constant   Intensity: moderate  Progression of Symptoms: same and constant  Accompanying Signs & Symptoms:  Shortness of breath: no  Sweating: no  Nausea/vomiting: no, a little loose stool  Lightheadedness: no  Palpitations: no  Fever/Chills: no  Cough: no           Heartburn: no  History:   Family history of heart disease: YES  Tobacco use: YES, 20 years ago  Previous similar symptoms: no   Precipitating factors:   Worse with exertion: no  Worse with deep breaths: no           Related to eating: no           Better with burping: no  Alleviating factors:   Therapies tried and outcome: none                                                                                                                                                                                                                                       she has had epigastric discomfort for a long time, definitely more than a couple of months.  She does have some short-term memory loss.           In 2016 she had an EGD and a CT of the abdomen because of these similar symptoms, results were unremarkable.     She is treated with a proton pump inhibitor.  She drinks too much coffee.                                                    She has a Cardiology appointment on 10/2 regarding worsening aortic stenosis.                                            Current Outpatient Medications   Medication Sig Dispense Refill     aspirin (ASPIRIN) 81 MG EC tablet Take 81 mg by mouth daily       benzoyl peroxide (BENZOYL PEROXIDE WASH) 10 % external liquid Wash affected area once a day in the shower 226 g 3     Calcium Carbonate-Vit D-Min (CALCIUM 600 + MINERALS PO) Take  by mouth.       Cholecalciferol (VITAMIN D)  2000 UNITS CAPS Take 1 capful by mouth daily       clindamycin (CLEOCIN T) 1 % external lotion Apply to area affected BID 60 mL 3     Glucosamine-Chondroitin (GLUCOSAMINE CHONDR COMPLEX PO) Take  by mouth.       levothyroxine (SYNTHROID/LEVOTHROID) 75 MCG tablet TAKE 1 TABLET BY MOUTH EVERY DAY 90 tablet 2     Omega-3 Fatty Acids (OMEGA 3 PO) Take  by mouth.       pantoprazole (PROTONIX) 20 MG EC tablet Take by mouth 30-60 minutes before a meal. 90 tablet 3     pravastatin (PRAVACHOL) 40 MG tablet Take 1 tablet (40 mg) by mouth daily 90 tablet 3       Review of Systems   CONSTITUTIONAL:NEGATIVE for fever, chills, change in weight  RESP:NEGATIVE for significant cough or SOB  CV: NEGATIVE for palpitations  GI: NEGATIVE for dysphagia, hematochezia, melena, nausea, poor appetite and vomiting      Objective    /72   Pulse 83   Resp 16   SpO2 99%   There is no height or weight on file to calculate BMI.  Physical Exam   GENERAL APPEARANCE: alert and no distress  RESP: lungs clear to auscultation - no rales, rhonchi or wheezes  CV: regular rates and rhythm, normal S1 S2, no S3 or S4 and murmur of aortic stenosis  ABDOMEN: soft, nontender, without hepatosplenomegaly or masses    CXR - Normal- no infiltrates, effusions, pneumothoraces, cardiomegaly or masses    awaiting formal interpretation from Radiologist at this time  EKG -not available; equipment malfunction        Assessment & Plan     Caren was seen today for chest pain.    Diagnoses and all orders for this visit:    Chest pain, unspecified type  -     XR Chest 2 Views; Future    Aortic valve stenosis, etiology of cardiac valve disease unspecified    Epigastric pain    Gastroesophageal reflux disease without esophagitis           Summary and implications:       She has ongoing epigastric discomfort, which does not limit her activity or her oral intake.      I do not feel that she needs further GI work-up at this time.         Continue PPI.  Patient  Instructions   Let's do this:                Stop drinking coffee; this may be upsetting your stomach.             You can substitute black tea, with skim milk.                   Your chest xray looks good.               We will see what the cardiologist says about your aortic valve.                         No follow-ups on file.    Rajinder Baptiste MD  [facility]

## 2020-10-02 ENCOUNTER — OFFICE VISIT (OUTPATIENT)
Dept: CARDIOLOGY | Facility: CLINIC | Age: 80
End: 2020-10-02
Attending: INTERNAL MEDICINE
Payer: COMMERCIAL

## 2020-10-02 VITALS
HEART RATE: 86 BPM | DIASTOLIC BLOOD PRESSURE: 76 MMHG | HEIGHT: 60 IN | WEIGHT: 163.7 LBS | SYSTOLIC BLOOD PRESSURE: 128 MMHG | BODY MASS INDEX: 32.14 KG/M2

## 2020-10-02 DIAGNOSIS — R07.89 ATYPICAL CHEST PAIN: Primary | ICD-10-CM

## 2020-10-02 DIAGNOSIS — I35.0 AORTIC VALVE STENOSIS, ETIOLOGY OF CARDIAC VALVE DISEASE UNSPECIFIED: Chronic | ICD-10-CM

## 2020-10-02 PROCEDURE — 93000 ELECTROCARDIOGRAM COMPLETE: CPT | Performed by: INTERNAL MEDICINE

## 2020-10-02 PROCEDURE — 99204 OFFICE O/P NEW MOD 45 MIN: CPT | Performed by: INTERNAL MEDICINE

## 2020-10-02 ASSESSMENT — MIFFLIN-ST. JEOR: SCORE: 1149.04
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
## 2020-10-08 ENCOUNTER — TELEPHONE (OUTPATIENT)
Dept: CARDIOLOGY | Facility: CLINIC | Age: 80
End: 2020-10-08

## 2020-10-08 ENCOUNTER — HOSPITAL ENCOUNTER (OUTPATIENT)
Dept: CARDIOLOGY | Facility: CLINIC | Age: 80
Discharge: HOME | End: 2020-10-08
Attending: INTERNAL MEDICINE | Admitting: INTERNAL MEDICINE
Payer: COMMERCIAL

## 2020-10-08 VITALS — DIASTOLIC BLOOD PRESSURE: 67 MMHG | OXYGEN SATURATION: 100 % | SYSTOLIC BLOOD PRESSURE: 128 MMHG | HEART RATE: 81 BPM

## 2020-10-08 DIAGNOSIS — R07.89 ATYPICAL CHEST PAIN: ICD-10-CM

## 2020-10-08 LAB
CREAT BLD-MCNC: 1 MG/DL (ref 0.52–1.04)
GFR SERPL CREATININE-BSD FRML MDRD: 54 ML/MIN/{1.73_M2}

## 2020-10-08 PROCEDURE — A9585 GADOBUTROL INJECTION: HCPCS | Performed by: INTERNAL MEDICINE

## 2020-10-08 PROCEDURE — 255N000002 HC RX 255 OP 636: Performed by: INTERNAL MEDICINE

## 2020-10-08 PROCEDURE — 93016 CV STRESS TEST SUPVJ ONLY: CPT

## 2020-10-08 PROCEDURE — 75563 CARD MRI W/STRESS IMG & DYE: CPT

## 2020-10-08 PROCEDURE — 75563 CARD MRI W/STRESS IMG & DYE: CPT | Mod: 26 | Performed by: INTERNAL MEDICINE

## 2020-10-08 PROCEDURE — 75565 CARD MRI VELOC FLOW MAPPING: CPT | Mod: 26 | Performed by: INTERNAL MEDICINE

## 2020-10-08 PROCEDURE — 93018 CV STRESS TEST I&R ONLY: CPT

## 2020-10-08 PROCEDURE — 82565 ASSAY OF CREATININE: CPT | Performed by: INTERNAL MEDICINE

## 2020-10-08 PROCEDURE — 250N000011 HC RX IP 250 OP 636: Performed by: INTERNAL MEDICINE

## 2020-10-08 RX ORDER — GADOBUTROL 604.72 MG/ML
20 INJECTION INTRAVENOUS ONCE
Status: COMPLETED | OUTPATIENT
Start: 2020-10-08 | End: 2020-10-08

## 2020-10-08 RX ORDER — DIAZEPAM 5 MG
5 TABLET ORAL EVERY 30 MIN PRN
Status: DISCONTINUED | OUTPATIENT
Start: 2020-10-08 | End: 2020-10-09 | Stop reason: HOSPADM

## 2020-10-08 RX ORDER — ACYCLOVIR 200 MG/1
0-1 CAPSULE ORAL
Status: DISCONTINUED | OUTPATIENT
Start: 2020-10-08 | End: 2020-10-09 | Stop reason: HOSPADM

## 2020-10-08 RX ORDER — ONDANSETRON 2 MG/ML
4 INJECTION INTRAMUSCULAR; INTRAVENOUS
Status: DISCONTINUED | OUTPATIENT
Start: 2020-10-08 | End: 2020-10-09 | Stop reason: HOSPADM

## 2020-10-08 RX ORDER — ALBUTEROL SULFATE 90 UG/1
2 AEROSOL, METERED RESPIRATORY (INHALATION) EVERY 5 MIN PRN
Status: DISCONTINUED | OUTPATIENT
Start: 2020-10-08 | End: 2020-10-09 | Stop reason: HOSPADM

## 2020-10-08 RX ORDER — DIPHENHYDRAMINE HCL 25 MG
25 CAPSULE ORAL
Status: DISCONTINUED | OUTPATIENT
Start: 2020-10-08 | End: 2020-10-09 | Stop reason: HOSPADM

## 2020-10-08 RX ORDER — REGADENOSON 0.08 MG/ML
0.4 INJECTION, SOLUTION INTRAVENOUS ONCE
Status: COMPLETED | OUTPATIENT
Start: 2020-10-08 | End: 2020-10-08

## 2020-10-08 RX ORDER — AMINOPHYLLINE 25 MG/ML
100 INJECTION, SOLUTION INTRAVENOUS ONCE
Status: DISCONTINUED | OUTPATIENT
Start: 2020-10-08 | End: 2020-10-09 | Stop reason: HOSPADM

## 2020-10-08 RX ORDER — DIPHENHYDRAMINE HYDROCHLORIDE 50 MG/ML
25-50 INJECTION INTRAMUSCULAR; INTRAVENOUS
Status: DISCONTINUED | OUTPATIENT
Start: 2020-10-08 | End: 2020-10-09 | Stop reason: HOSPADM

## 2020-10-08 RX ORDER — METHYLPREDNISOLONE SODIUM SUCCINATE 125 MG/2ML
125 INJECTION, POWDER, LYOPHILIZED, FOR SOLUTION INTRAMUSCULAR; INTRAVENOUS
Status: DISCONTINUED | OUTPATIENT
Start: 2020-10-08 | End: 2020-10-09 | Stop reason: HOSPADM

## 2020-10-08 RX ADMIN — GADOBUTROL 20 ML: 604.72 INJECTION INTRAVENOUS at 10:48

## 2020-10-08 RX ADMIN — REGADENOSON 0.4 MG: 0.08 INJECTION, SOLUTION INTRAVENOUS at 10:10

## 2020-10-08 NOTE — PROGRESS NOTES
Cardiac Stress protocol  AoV flows  Contrast administered per weight based protocol.  Per Dr Simpson

## 2020-10-08 NOTE — TELEPHONE ENCOUNTER
Post OV 10-2-2020  Cardiac MRi  CONCLUSIONS:   1. Hyperdynamic LV systolic function. Normal RV systolic function.  2. Moderate aortic valve stenosis.  3. Late gadolinium enhancement imaging shows no MI, fibrosis or infiltrative disease  4. No evidence of inducible ischemia on regadenoson stress perfusion.

## 2020-10-09 NOTE — TELEPHONE ENCOUNTER
Patient notified of Cardiac MRI and no inducible ischemia and will follow up in 1 year and follow up with PCP.verb        No evidence of inducible ischemia on stress test.  Chest discomfort is nonanginal and I recommend follow-up with primary care physician regarding it.   Follow-up in cardiology clinic with a repeat echocardiogram in 1 year for moderate aortic stenosis.    Thanks  Tahir

## 2020-10-30 PROBLEM — R07.9 CHEST PAIN, UNSPECIFIED TYPE: Status: ACTIVE | Noted: 2020-10-30

## 2020-11-02 ENCOUNTER — OFFICE VISIT (OUTPATIENT)
Dept: FAMILY MEDICINE | Facility: CLINIC | Age: 80
End: 2020-11-02
Payer: COMMERCIAL

## 2020-11-02 VITALS
OXYGEN SATURATION: 100 % | RESPIRATION RATE: 16 BRPM | TEMPERATURE: 98 F | SYSTOLIC BLOOD PRESSURE: 144 MMHG | HEART RATE: 85 BPM | DIASTOLIC BLOOD PRESSURE: 88 MMHG

## 2020-11-02 DIAGNOSIS — R41.3 MEMORY LOSS: ICD-10-CM

## 2020-11-02 DIAGNOSIS — R10.13 ABDOMINAL PAIN, EPIGASTRIC: ICD-10-CM

## 2020-11-02 DIAGNOSIS — R07.9 CHEST PAIN, UNSPECIFIED TYPE: ICD-10-CM

## 2020-11-02 DIAGNOSIS — R07.89 XYPHOIDALGIA: Primary | ICD-10-CM

## 2020-11-02 PROCEDURE — 99213 OFFICE O/P EST LOW 20 MIN: CPT | Performed by: INTERNAL MEDICINE

## 2020-11-02 NOTE — PROGRESS NOTES
"Subjective     Caren Eid is a 80 year old female who presents to clinic today for the following health issues:    HPI         Concern - cardiology f/u  Onset: 10/2/2020  Description: pt is here today to discuss visit with cardiology regarding atypical chest pain and MRI.  Pt states that she is still having some sternum pain, constant, 6/10, no sob            She points to the xiphoid as the source of most of her   anterior chest pain.             She states this pain is \"not severe\", but usually present.              She wonders if \"I have to live with this\".                      She has memory issues.    She apparently does not recall that she is actually had similar complaints for several years.              She had a negative EGD and evaluation of similar symptoms in 2016, also had a CT scan.              Recent chest x-ray was negative.  Recent cardiology evaluation was negative, other than moderate but stable aortic stenosis.    Review of Systems   CONSTITUTIONAL:NEGATIVE for fever, chills, change in weight  RESP:NEGATIVE for significant cough or SOB  CV: NEGATIVE for chest pain/chest pressure, dyspnea on exertion and orthopnea      Objective    BP (!) 144/88   Pulse 85   Temp 98  F (36.7  C) (Tympanic)   Resp 16   SpO2 100%   There is no height or weight on file to calculate BMI.  Physical Exam   GENERAL APPEARANCE: alert and no distress  CV: regular rates and rhythm and there is tenderness to palpation right over the xiphoid.           No palpable abnormalities other than tenderness.  NEURO: There are memory issues as she repeats herself            Assessment & Plan     Caren was seen today for recheck.    Diagnoses and all orders for this visit:    Xyphoidalgia    Chest pain, unspecified type    Abdominal pain, epigastric    Memory loss            Summary and implications:  We reviewed her situation at length.                 She is not interested in a pain clinic referral for injection therapy.  " Follow-up as needed.           There are no Patient Instructions on file for this visit.    No follow-ups on file.    Rajinder Baptiste MD  [facility]

## 2020-11-18 DIAGNOSIS — E03.9 HYPOTHYROIDISM, UNSPECIFIED TYPE: ICD-10-CM

## 2020-11-18 RX ORDER — LEVOTHYROXINE SODIUM 75 UG/1
TABLET ORAL
Qty: 90 TABLET | Refills: 2 | Status: SHIPPED | OUTPATIENT
Start: 2020-11-18

## 2020-11-19 DIAGNOSIS — K21.9 GASTROESOPHAGEAL REFLUX DISEASE WITHOUT ESOPHAGITIS: ICD-10-CM

## 2020-11-19 RX ORDER — PANTOPRAZOLE SODIUM 20 MG/1
TABLET, DELAYED RELEASE ORAL
Qty: 90 TABLET | Refills: 3 | Status: SHIPPED | OUTPATIENT
Start: 2020-11-19

## 2020-12-22 DIAGNOSIS — E78.5 HYPERLIPIDEMIA LDL GOAL <130: ICD-10-CM

## 2020-12-22 RX ORDER — PRAVASTATIN SODIUM 40 MG
TABLET ORAL
Qty: 90 TABLET | Refills: 3 | Status: SHIPPED | OUTPATIENT
Start: 2020-12-22

## 2020-12-23 ENCOUNTER — NURSE TRIAGE (OUTPATIENT)
Dept: FAMILY MEDICINE | Facility: CLINIC | Age: 80
End: 2020-12-23

## 2020-12-23 NOTE — TELEPHONE ENCOUNTER
SECONDARY TRIAGE REQUIRED PER PROTOCOL:    Chest pain at the breastbone that gets worse when she touches it. Would like to see pcp for this as it has been going on for 3 weeks. Patient verbalized understanding of seeking emergent care for worsening of symptoms.     Routed to provider to review and advise. Please send to local team for follow up.     Thank you,    Emerald Vasquez RN  [facility]       Additional Information    Negative: Severe difficulty breathing (e.g., struggling for each breath, speaks in single words)    Negative: Passed out (i.e., fainted, collapsed and was not responding)    Negative: Chest pain lasting longer than 5 minutes and ANY of the following:* Over 50 years old* Over 30 years old and at least one cardiac risk factor (i.e., high blood pressure, diabetes, high cholesterol, obesity, smoker or strong family history of heart disease)* Pain is crushing, pressure-like, or heavy * Took nitroglycerin and chest pain was not relieved* History of heart disease (i.e., angina, heart attack, bypass surgery, angioplasty, CHF)    Negative: Visible sweat on face or sweat dripping down face    Negative: Sounds like a life-threatening emergency to the triager    Negative: Followed an injury to chest    Negative: SEVERE chest pain    Negative: Pain also present in shoulder(s) or arm(s) or jaw    Negative: Difficulty breathing    Negative: Cocaine use within last 3 days    Negative: History of prior 'blood clot' in leg or lungs (i.e., deep vein thrombosis, pulmonary embolism)    Negative: Recent illness requiring prolonged bed rest (i.e., immobilization)    Negative: Hip or leg fracture in past 2 months (e.g, or had cast on leg or ankle)    Negative: Major surgery in the past month    Negative: Recent long-distance travel with prolonged time in car, bus, plane, or train (i.e., within past 2 weeks; 6 or more hours duration)    Negative: Heart beating irregularly or very rapidly    Chest  "pain lasting longer than 5 minutes    Answer Assessment - Initial Assessment Questions  1. LOCATION: \"Where does it hurt?\"        Breastbone   2. RADIATION: \"Does the pain go anywhere else?\" (e.g., into neck, jaw, arms, back)      No   3. ONSET: \"When did the chest pain begin?\" (Minutes, hours or days)       Few weeks   4. PATTERN \"Does the pain come and go, or has it been constant since it started?\"  \"Does it get worse with exertion?\"       Constant, not worse with activity   5. DURATION: \"How long does it last\" (e.g., seconds, minutes, hours)      Breastbone tender   6. SEVERITY: \"How bad is the pain?\"  (e.g., Scale 1-10; mild, moderate, or severe)     - MILD (1-3): doesn't interfere with normal activities      - MODERATE (4-7): interferes with normal activities or awakens from sleep     - SEVERE (8-10): excruciating pain, unable to do any normal activities        Patient reports mild, 5-6/10  7. CARDIAC RISK FACTORS: \"Do you have any history of heart problems or risk factors for heart disease?\" (e.g., prior heart attack, angina; high blood pressure, diabetes, being overweight, high cholesterol, smoking, or strong family history of heart disease)      High cholesterol, mother has heart disease   8. PULMONARY RISK FACTORS: \"Do you have any history of lung disease?\"  (e.g., blood clots in lung, asthma, emphysema, birth control pills)      no  9. CAUSE: \"What do you think is causing the chest pain?\"      Unsure   10. OTHER SYMPTOMS: \"Do you have any other symptoms?\" (e.g., dizziness, nausea, vomiting, sweating, fever, difficulty breathing, cough)        No  11. PREGNANCY: \"Is there any chance you are pregnant?\" \"When was your last menstrual period?\"        NA    Protocols used: CHEST PAIN-A-OH      "

## 2020-12-23 NOTE — TELEPHONE ENCOUNTER
She has had similar pain for months or even years.       She also has cognitive impairment.                                              She can set up an appointment, but this is not urgent.

## 2020-12-23 NOTE — TELEPHONE ENCOUNTER
"Patient informed. Agreed, \"not too concerned about it and will schedule sometime after marlon\"    OV scheduled; reasons to be seen sooner discussed.    Talita ZAMORAN, RN, PHN      "

## 2021-01-05 NOTE — PATIENT INSTRUCTIONS
I believe you have some irritation in your lower breast bone and ribs.            I do not believe there is anything seriously wrong there.                  If the pain gets a lot worse, then we can do a scan.                I suggest that you take aspirin 81 mg per day to decrease your risk of heart attack and stroke.

## 2021-01-06 ENCOUNTER — OFFICE VISIT (OUTPATIENT)
Dept: INTERNAL MEDICINE | Facility: CLINIC | Age: 81
End: 2021-01-06
Payer: COMMERCIAL

## 2021-01-06 VITALS
OXYGEN SATURATION: 98 % | DIASTOLIC BLOOD PRESSURE: 76 MMHG | BODY MASS INDEX: 32.81 KG/M2 | HEART RATE: 94 BPM | SYSTOLIC BLOOD PRESSURE: 120 MMHG | TEMPERATURE: 98 F | WEIGHT: 168 LBS

## 2021-01-06 DIAGNOSIS — R41.3 MEMORY LOSS: Chronic | ICD-10-CM

## 2021-01-06 DIAGNOSIS — R07.9 CHEST PAIN, UNSPECIFIED TYPE: ICD-10-CM

## 2021-01-06 DIAGNOSIS — I35.0 AORTIC VALVE STENOSIS, ETIOLOGY OF CARDIAC VALVE DISEASE UNSPECIFIED: ICD-10-CM

## 2021-01-06 DIAGNOSIS — R07.89 XYPHOIDALGIA: Primary | ICD-10-CM

## 2021-01-06 PROCEDURE — 99213 OFFICE O/P EST LOW 20 MIN: CPT | Performed by: INTERNAL MEDICINE

## 2021-01-06 NOTE — PROGRESS NOTES
Assessment & Plan     Xyphoidalgia          Ongoing sx.           We discussed that we could do some further imaging.     Chest pain, unspecified type   as above.     Aortic valve stenosis, etiology of cardiac valve disease unspecified  Reviewed that another echo needed next fall    Memory loss  Ongoing.   She does not recall that we have talked about her chest wall pain several times.       Review of external notes as documented above                        Patient Instructions                 I believe you have some irritation in your lower breast bone and ribs.            I do not believe there is anything seriously wrong there.                  If the pain gets a lot worse, then we can do a scan.                I suggest that you take aspirin 81 mg per day to decrease your risk of heart attack and stroke.                                                                                                                         Return in about 6 months (around 7/6/2021) for follow up of several issues.    Rajinder Baptiste MD  [facility]     Caren Eid is a 80 year old who presents to clinic today for the following health issues     HPI       Concern - chest tenderness  Onset: few weeks  Description: xiphoid bone pain  Intensity: moderate  Progression of Symptoms:  same  Accompanying Signs & Symptoms: none  Previous history of similar problem: none  Precipitating factors:        Worsened by: none  Alleviating factors:        Improved by: none  Therapies tried and outcome:  none     Ongoing sx with lower chest wall pain.          Review of Systems   CONSTITUTIONAL:NEGATIVE for fever, chills, change in weight  RESP:NEGATIVE for significant cough or SOB  CV: NEGATIVE for lower extremity edema and palpitations      Objective    /76   Pulse 94   Temp 98  F (36.7  C) (Oral)   Wt 76.2 kg (168 lb)   SpO2 98%   BMI 32.81 kg/m    Body mass index is 32.81  kg/m .  Physical Exam   GENERAL APPEARANCE: alert, no distress and over weight  RESP: lungs clear to auscultation - no rales, rhonchi or wheezes  CV: regular rates and rhythm and AORTIC STENOSIS murmur; tender to palpation over the xyphoid

## 2022-02-20 ENCOUNTER — HEALTH MAINTENANCE LETTER (OUTPATIENT)
Age: 82
End: 2022-02-20
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
## 2022-10-23 ENCOUNTER — HEALTH MAINTENANCE LETTER (OUTPATIENT)
Age: 82
End: 2022-10-23

## 2023-04-02 ENCOUNTER — HEALTH MAINTENANCE LETTER (OUTPATIENT)
Age: 83
End: 2023-04-02

## (undated) RX ORDER — REGADENOSON 0.08 MG/ML
INJECTION, SOLUTION INTRAVENOUS
Status: DISPENSED
Start: 2020-10-08